# Patient Record
Sex: MALE | Race: WHITE | HISPANIC OR LATINO | Employment: OTHER | URBAN - METROPOLITAN AREA
[De-identification: names, ages, dates, MRNs, and addresses within clinical notes are randomized per-mention and may not be internally consistent; named-entity substitution may affect disease eponyms.]

---

## 2017-03-14 ENCOUNTER — TELEPHONE (OUTPATIENT)
Dept: CARDIOLOGY | Facility: CLINIC | Age: 67
End: 2017-03-14

## 2017-03-14 DIAGNOSIS — I48.20 CHRONIC ATRIAL FIBRILLATION (HCC): Primary | ICD-10-CM

## 2017-03-14 PROCEDURE — 0296T ZIO PATCH: CPT | Performed by: INTERNAL MEDICINE

## 2017-03-14 NOTE — TELEPHONE ENCOUNTER
Pt had another episode while at the gym in his home. He was doing squats and weights when all of a sudden he lost his memory and was asking his son what is this and that. Pt said it lasted 30-45min then went away. He had this happen in the Summer of 2013 and had a full work up with Neuro and nothing was found. He called Neuro today and they wanted him to check with you first before seeing him to see if it has anything to do with palpitations or blood thinner. PT does not report palpitation during exercise.

## 2017-03-15 NOTE — TELEPHONE ENCOUNTER
Spoke to pt, INR was 2.2.  Discussed with Dr. Abrams he would like pt to add baby ASA 81mg daily to his medication regimen, wear a Zio patch so we can evaluate his afib rates and see neuro (Dr. Hammond will see, he is a friend).  See in clinic in 1-2 weeks.

## 2017-03-29 ENCOUNTER — OFFICE VISIT (OUTPATIENT)
Dept: CARDIOLOGY | Facility: CLINIC | Age: 67
End: 2017-03-29

## 2017-03-29 VITALS
BODY MASS INDEX: 31.5 KG/M2 | HEART RATE: 82 BPM | WEIGHT: 225 LBS | SYSTOLIC BLOOD PRESSURE: 128 MMHG | DIASTOLIC BLOOD PRESSURE: 74 MMHG | HEIGHT: 71 IN

## 2017-03-29 DIAGNOSIS — I48.20 CHRONIC ATRIAL FIBRILLATION (HCC): ICD-10-CM

## 2017-03-29 DIAGNOSIS — I38 VALVULAR HEART DISEASE: ICD-10-CM

## 2017-03-29 DIAGNOSIS — G45.4 AMNESIA, GLOBAL, TRANSIENT: Primary | ICD-10-CM

## 2017-03-29 DIAGNOSIS — I48.20 CHRONIC ATRIAL FIBRILLATION (HCC): Primary | ICD-10-CM

## 2017-03-29 PROCEDURE — 99214 OFFICE O/P EST MOD 30 MIN: CPT | Performed by: INTERNAL MEDICINE

## 2017-03-29 NOTE — PROGRESS NOTES
Feliz CORLEY Dandre  1950  [phone]      03/29/2017    [facility] CARDIOLOGY     Srini Ornelas MD  [address]  [place] [id number]    Chief Complaint   Patient presents with   • Atrial Fibrillation       Problem List:   Problem List:   1. Long-standing persistent atrial fibrillation:  a. CHADS0.  b. Pulmonary vein isolation procedure in 2002 and 2003, [place], and February 2011 in [place] at [facility].   c. LAE approximately 5.2 cm by echo, 07/31/2013.   d. Holter monitor, 07/31/2013, with heart rate of 39-79, average 54 beats per minute with 6 PVCs and 107 PACs.   e. Recurrent atrial fibrillation, off amiodarone therapy with re-initiation of amiodarone and ECV at [facility] by Dr. Gould, 02/03/2014.  f. Hospitalization and initiation of Tikosyn with subsequent external cardioversion of atrial flutter, 06/26/2014.   g. Breakthrough atrial fibrillation with subsequent discontinuation of Tikosyn, April 2015.  h. Echocardiogram, 05/20/2015, revealing atrial fibrillation, normal LV systolic function, mild AR, moderate MR, left atrial dimension of 4.9 cm.  i. EKG, 02/16/2016, revealing atrial flutter at a rate of 100 on Toprol therapy.  2. Valvular heart disease:  a. Echocardiogram, 07/31/2013, showing mild to moderate MR, mild to moderate AI. Normal LV systolic function.   b. Echo: 5/2015: NL LVEF , mod MR, Mild AI  3. Dyslipidemia.   4. Obesity, BMI 31.   5. Tobacco abuse.   6. Hypothyroid.   7. GERD.   8. Migraine headaches.     Allergies  No Known Allergies    Current Medications    Current Outpatient Prescriptions:   •  cetirizine (ZyrTEC) 10 MG tablet, Take 10 mg by mouth daily., Disp: , Rfl:   •  digoxin (LANOXIN) 250 MCG tablet, Take 1 tablet by mouth daily., Disp: 90 tablet, Rfl: 3  •  levothyroxine (SYNTHROID, LEVOTHROID) 50 MCG tablet, Take 50 mcg by mouth daily., Disp: , Rfl:   •  metoprolol succinate XL (TOPROL-XL) 25 MG 24 hr tablet, TAKE  "ONE TABLET BY MOUTH TWICE A DAY, Disp: 180 tablet, Rfl: 2  •  montelukast (SINGULAIR) 10 MG tablet, Take 10 mg by mouth every night., Disp: , Rfl:   •  ranitidine (ZANTAC) 150 MG tablet, Take 150 mg by mouth every night., Disp: , Rfl:   •  rosuvastatin (CRESTOR) 10 MG tablet, Take 10 mg by mouth daily., Disp: , Rfl:   •  tadalafil (CIALIS) 5 MG tablet, Take 5 mg by mouth daily., Disp: , Rfl:   •  warfarin (COUMADIN) 2 MG tablet, Take 2 mg by mouth daily., Disp: , Rfl:   •  warfarin (COUMADIN) 5 MG tablet, Take 5 mg by mouth daily., Disp: , Rfl:     History of Present Illness   HPI    Pt presents for follow up of AF. Since we last saw the pt, pt had an episode of confused after working out in the basement. The confusion was about ten minutes after workout. Did not remember anything for 40 minutes. No weakness, slurred speech or syncope. Did not go the ER. PT INR was 2.2. No checked HR or BP. No further episodes since then. denies SOB, CP, LH, and dizziness. Denies any hospitalizations, ER visits, bleeding, or TIA/CVA symptoms. Overall feels well. No change in palps. See Neurologist with EEG with some abnormality. Maybe a seizure. Repeat Head MRI and carotid ultrasound which was ok. Had similar episode 4 years ago. Pt states that he has had labile PT INR and wants to switch to eliquis.    ROS:  General:  Denies fatigue, weight gain or loss  Cardiovascular:  Denies CP, PND, syncope, near syncope, edema or palpitations.  Pulmonary:  Denies WALKER, cough, or wheezing      Vitals:    03/29/17 1025   BP: 128/74   BP Location: Right arm   Patient Position: Sitting   Pulse: 82   Weight: 225 lb (102 kg)   Height: 71\" (180.3 cm)     PE:  General: NAD  Neck: no JVD, no carotid bruits, no TM  Heart RRR, NL S1, S2, S4 present, no rubs, murmurs  Lungs: CTA, no wheezes, rhonchi, or rales  Abd: soft, non-tender, NL BS  Ext: No musculoskeletal deformities, no edema, cyanosis, or clubbing  Psych: normal mood and affect    Diagnostic " Data:  Procedures    1. Amnesia, global, transient    2. Chronic atrial fibrillation    3. Valvular heart disease          Plan:  1) Global amnesia vs seizure: per Neurologist: check zio-patch  Continue present medications.   2) Anticoagulation: switch warfarin to eliquis  3) HTN  Wt loss, exercise, salt reduction  4) VHD: Repeat echocardiogram to evaluate MR etc    F/up in 6 months

## 2017-04-24 ENCOUNTER — HOSPITAL ENCOUNTER (OUTPATIENT)
Dept: CARDIOLOGY | Facility: HOSPITAL | Age: 67
Discharge: HOME | End: 2017-04-24
Attending: INTERNAL MEDICINE | Admitting: INTERNAL MEDICINE

## 2017-04-24 VITALS
WEIGHT: 225 LBS | HEIGHT: 71 IN | DIASTOLIC BLOOD PRESSURE: 69 MMHG | SYSTOLIC BLOOD PRESSURE: 124 MMHG | BODY MASS INDEX: 31.5 KG/M2

## 2017-04-24 DIAGNOSIS — I48.20 CHRONIC ATRIAL FIBRILLATION (HCC): ICD-10-CM

## 2017-04-24 LAB
BH CV ECHO MEAS - AI DEC SLOPE: 182.5 CM/SEC^2
BH CV ECHO MEAS - AI MAX PG: 65.1 MMHG
BH CV ECHO MEAS - AI MAX VEL: 403.5 CM/SEC
BH CV ECHO MEAS - AI P1/2T: 647.6 MSEC
BH CV ECHO MEAS - AO MAX PG (FULL): 2 MMHG
BH CV ECHO MEAS - AO MAX PG: 4.8 MMHG
BH CV ECHO MEAS - AO MEAN PG (FULL): 2 MMHG
BH CV ECHO MEAS - AO MEAN PG: 3 MMHG
BH CV ECHO MEAS - AO ROOT AREA (BSA CORRECTED): 1.8
BH CV ECHO MEAS - AO ROOT AREA: 12.6 CM^2
BH CV ECHO MEAS - AO ROOT DIAM: 4 CM
BH CV ECHO MEAS - AO V2 MAX: 109 CM/SEC
BH CV ECHO MEAS - AO V2 MEAN: 76.6 CM/SEC
BH CV ECHO MEAS - AO V2 VTI: 23 CM
BH CV ECHO MEAS - AVA(I,A): 2.8 CM^2
BH CV ECHO MEAS - AVA(I,D): 2.8 CM^2
BH CV ECHO MEAS - AVA(V,A): 3.3 CM^2
BH CV ECHO MEAS - AVA(V,D): 3.3 CM^2
BH CV ECHO MEAS - BSA(HAYCOCK): 2.3 M^2
BH CV ECHO MEAS - BSA: 2.2 M^2
BH CV ECHO MEAS - BZI_BMI: 31.4 KILOGRAMS/M^2
BH CV ECHO MEAS - BZI_METRIC_HEIGHT: 180.3 CM
BH CV ECHO MEAS - BZI_METRIC_WEIGHT: 102.1 KG
BH CV ECHO MEAS - CONTRAST EF (2CH): 76 ML/M^2
BH CV ECHO MEAS - CONTRAST EF 4CH: 64 ML/M^2
BH CV ECHO MEAS - EDV(CUBED): 112.7 ML
BH CV ECHO MEAS - EDV(MOD-SP2): 100 ML
BH CV ECHO MEAS - EDV(MOD-SP4): 86 ML
BH CV ECHO MEAS - EDV(TEICH): 109.1 ML
BH CV ECHO MEAS - EF(CUBED): 73.3 %
BH CV ECHO MEAS - EF(MOD-SP2): 76 %
BH CV ECHO MEAS - EF(MOD-SP4): 64 %
BH CV ECHO MEAS - EF(TEICH): 65 %
BH CV ECHO MEAS - ESV(CUBED): 30.1 ML
BH CV ECHO MEAS - ESV(MOD-SP2): 24 ML
BH CV ECHO MEAS - ESV(MOD-SP4): 31 ML
BH CV ECHO MEAS - ESV(TEICH): 38.2 ML
BH CV ECHO MEAS - FS: 35.6 %
BH CV ECHO MEAS - IVS/LVPW: 1.1
BH CV ECHO MEAS - IVSD: 1.3 CM
BH CV ECHO MEAS - LA DIMENSION: 4.8 CM
BH CV ECHO MEAS - LA/AO: 1.2
BH CV ECHO MEAS - LAT PEAK E' VEL: 12.5 CM/SEC
BH CV ECHO MEAS - LV DIASTOLIC VOL/BSA (35-75): 38.8 ML/M^2
BH CV ECHO MEAS - LV MASS(C)D: 229.2 GRAMS
BH CV ECHO MEAS - LV MASS(C)DI: 103.4 GRAMS/M^2
BH CV ECHO MEAS - LV MAX PG: 2.7 MMHG
BH CV ECHO MEAS - LV MEAN PG: 1 MMHG
BH CV ECHO MEAS - LV SYSTOLIC VOL/BSA (12-30): 14 ML/M^2
BH CV ECHO MEAS - LV V1 MAX: 82.8 CM/SEC
BH CV ECHO MEAS - LV V1 MEAN: 49.2 CM/SEC
BH CV ECHO MEAS - LV V1 VTI: 15.1 CM
BH CV ECHO MEAS - LVIDD: 4.8 CM
BH CV ECHO MEAS - LVIDS: 3.2 CM
BH CV ECHO MEAS - LVLD AP2: 7.2 CM
BH CV ECHO MEAS - LVLD AP4: 7.4 CM
BH CV ECHO MEAS - LVLS AP2: 6.4 CM
BH CV ECHO MEAS - LVLS AP4: 6.7 CM
BH CV ECHO MEAS - LVOT AREA (M): 4.2 CM^2
BH CV ECHO MEAS - LVOT AREA: 4.3 CM^2
BH CV ECHO MEAS - LVOT DIAM: 2.4 CM
BH CV ECHO MEAS - LVPWD: 1.3 CM
BH CV ECHO MEAS - MED PEAK E' VEL: 7.09 CM/SEC
BH CV ECHO MEAS - MV DEC TIME: 0.15 SEC
BH CV ECHO MEAS - MV E MAX VEL: 68.2 CM/SEC
BH CV ECHO MEAS - PA ACC SLOPE: 682 CM/SEC^2
BH CV ECHO MEAS - PA ACC TIME: 0.11 SEC
BH CV ECHO MEAS - PA MAX PG: 2.5 MMHG
BH CV ECHO MEAS - PA PR(ACCEL): 27.7 MMHG
BH CV ECHO MEAS - PA V2 MAX: 78.5 CM/SEC
BH CV ECHO MEAS - PULM DIAS VEL: 79.1 CM/SEC
BH CV ECHO MEAS - PULM S/D: 0.36
BH CV ECHO MEAS - PULM SYS VEL: 28.5 CM/SEC
BH CV ECHO MEAS - RAP SYSTOLE: 10 MMHG
BH CV ECHO MEAS - RVDD: 3.3 CM
BH CV ECHO MEAS - RVSP: 27.8 MMHG
BH CV ECHO MEAS - SI(AO): 130.4 ML/M^2
BH CV ECHO MEAS - SI(CUBED): 37.3 ML/M^2
BH CV ECHO MEAS - SI(LVOT): 29.5 ML/M^2
BH CV ECHO MEAS - SI(MOD-SP2): 34.3 ML/M^2
BH CV ECHO MEAS - SI(MOD-SP4): 24.8 ML/M^2
BH CV ECHO MEAS - SI(TEICH): 32 ML/M^2
BH CV ECHO MEAS - SV(AO): 289 ML
BH CV ECHO MEAS - SV(CUBED): 82.6 ML
BH CV ECHO MEAS - SV(LVOT): 65.5 ML
BH CV ECHO MEAS - SV(MOD-SP2): 76 ML
BH CV ECHO MEAS - SV(MOD-SP4): 55 ML
BH CV ECHO MEAS - SV(TEICH): 70.9 ML
BH CV ECHO MEAS - TAPSE (>1.6): 1.5 CM2
BH CV ECHO MEAS - TR MAX VEL: 211 CM/SEC
BH CV XLRA - RV BASE: 3.8 CM
BH CV XLRA - RV LENGTH: 6.4 CM
BH CV XLRA - RV MID: 3.8 CM
BH CV XLRA - TDI S': 9.86 CM/SEC
E/E' RATIO: 5.5
LEFT ATRIUM VOLUME INDEX: 45.9 ML/M2
LV EF 2D ECHO EST: 65 %

## 2017-04-24 PROCEDURE — 93306 TTE W/DOPPLER COMPLETE: CPT

## 2017-04-24 PROCEDURE — 93306 TTE W/DOPPLER COMPLETE: CPT | Performed by: INTERNAL MEDICINE

## 2017-05-18 ENCOUNTER — OFFICE VISIT (OUTPATIENT)
Dept: CARDIOLOGY | Facility: CLINIC | Age: 67
End: 2017-05-18

## 2017-05-18 DIAGNOSIS — I48.20 CHRONIC ATRIAL FIBRILLATION (HCC): ICD-10-CM

## 2017-05-18 PROCEDURE — 0298T ZIO PATCH: CPT | Performed by: INTERNAL MEDICINE

## 2017-08-04 RX ORDER — METOPROLOL SUCCINATE 25 MG/1
TABLET, EXTENDED RELEASE ORAL
Qty: 180 TABLET | Refills: 2 | Status: SHIPPED | OUTPATIENT
Start: 2017-08-04 | End: 2017-10-11 | Stop reason: SDUPTHER

## 2017-10-11 ENCOUNTER — OFFICE VISIT (OUTPATIENT)
Dept: CARDIOLOGY | Facility: CLINIC | Age: 67
End: 2017-10-11

## 2017-10-11 VITALS
HEART RATE: 83 BPM | SYSTOLIC BLOOD PRESSURE: 115 MMHG | BODY MASS INDEX: 30.28 KG/M2 | WEIGHT: 223.6 LBS | DIASTOLIC BLOOD PRESSURE: 66 MMHG | HEIGHT: 72 IN

## 2017-10-11 DIAGNOSIS — I48.20 CHRONIC ATRIAL FIBRILLATION (HCC): Primary | ICD-10-CM

## 2017-10-11 PROCEDURE — 99213 OFFICE O/P EST LOW 20 MIN: CPT | Performed by: INTERNAL MEDICINE

## 2017-10-11 RX ORDER — METOPROLOL SUCCINATE 25 MG/1
25 TABLET, EXTENDED RELEASE ORAL 2 TIMES DAILY
Qty: 180 TABLET | Refills: 3 | Status: SHIPPED | OUTPATIENT
Start: 2017-10-11 | End: 2021-06-16

## 2017-10-11 RX ORDER — ROSUVASTATIN CALCIUM 10 MG/1
10 TABLET, COATED ORAL DAILY
Qty: 90 TABLET | Refills: 3 | Status: SHIPPED | OUTPATIENT
Start: 2017-10-11 | End: 2018-10-22 | Stop reason: SDUPTHER

## 2017-10-11 NOTE — PROGRESS NOTES
Feliz CORLEY Dandre  1950  [phone]      10/11/2017    [facility] CARDIOLOGY     Srini Ornelas MD  [address]  [facility] [address]    Chief Complaint   Patient presents with   • Atrial Fibrillation       Problem List:   Problem List:   1. Long-standing persistent atrial fibrillation:  a. CHADS0.  b. Pulmonary vein isolation procedure in 2002 and 2003, [place], and February 2011 in [place] at [facility].   c. LAE approximately 5.2 cm by echo, 07/31/2013.   d. Holter monitor, 07/31/2013, with heart rate of 39-79, average 54 beats per minute with 6 PVCs and 107 PACs.   e. Recurrent atrial fibrillation, off amiodarone therapy with re-initiation of amiodarone and ECV at [facility] by Dr. Gould, 02/03/2014.  f. Hospitalization and initiation of Tikosyn with subsequent external cardioversion of atrial flutter, 06/26/2014.   g. Breakthrough atrial fibrillation with subsequent discontinuation of Tikosyn, April 2015.  h. Echocardiogram, 05/20/2015, revealing atrial fibrillation, normal LV systolic function, mild AR, moderate MR, left atrial dimension of 4.9 cm.  i. EKG, 02/16/2016, revealing atrial flutter at a rate of 100 on Toprol therapy.  j. Echo 4/24/17: NL LVEF, Mild MR/TR/AR  k. ER 5/22/17: AF avg HR 83 bpm with low 40 bpm   2. Valvular heart disease:  a. Echocardiogram, 07/31/2013, showing mild to moderate MR, mild to moderate AI. Normal LV systolic function.   b. Echo: 5/2015: NL LVEF , mod MR, Mild AI  3. Dyslipidemia.   4. Obesity, BMI 31.   5. Tobacco abuse.   6. Hypothyroid.   7. GERD.   8. Migraine headaches.  Allergies  No Known Allergies    Current Medications    Current Outpatient Prescriptions:   •  apixaban (ELIQUIS) 5 MG tablet tablet, Take 1 tablet by mouth 2 (Two) Times a Day., Disp: 60 tablet, Rfl: 6  •  cetirizine (ZyrTEC) 10 MG tablet, Take 10 mg by mouth daily., Disp: , Rfl:   •  digoxin (LANOXIN) 250 MCG tablet, Take 1 tablet by mouth  "daily., Disp: 90 tablet, Rfl: 3  •  levothyroxine (SYNTHROID, LEVOTHROID) 50 MCG tablet, Take 50 mcg by mouth daily., Disp: , Rfl:   •  metoprolol succinate XL (TOPROL-XL) 25 MG 24 hr tablet, TAKE ONE TABLET BY MOUTH TWICE A DAY, Disp: 180 tablet, Rfl: 2  •  montelukast (SINGULAIR) 10 MG tablet, Take 10 mg by mouth every night., Disp: , Rfl:   •  ranitidine (ZANTAC) 150 MG tablet, Take 150 mg by mouth every night., Disp: , Rfl:   •  rosuvastatin (CRESTOR) 10 MG tablet, Take 10 mg by mouth daily., Disp: , Rfl:   •  tadalafil (CIALIS) 5 MG tablet, Take 5 mg by mouth daily., Disp: , Rfl:     History of Present Illness   HPI    Pt presents for follow up of AF/VHD. Since we last saw the pt, pt denies SOB, CP, LH, and dizziness. Denies any hospitalizations, ER visits, bleeding, or TIA/CVA symptoms. Overall feels well except for DJD in knees and shoulder. No further episodes of confusion. Rare palps    ROS:  General:  + fatigue, No weight gain or loss  Cardiovascular:  Denies CP, PND, syncope, near syncope, edema + palpitations.  Pulmonary:  Denies WALKER, cough, or wheezing      Vitals:    10/11/17 1101   BP: 115/66   BP Location: Left arm   Patient Position: Sitting   Pulse: 83   Weight: 223 lb 9.6 oz (101 kg)   Height: 72\" (182.9 cm)     PE:  General: NAD  Neck: no JVD, no carotid bruits, no TM  Heart irreg irreg , NL S1, S2, no rubs, murmurs  Lungs: CTA, no wheezes, rhonchi, or rales  Abd: soft, non-tender, NL BS  Ext: No musculoskeletal deformities, no edema, cyanosis, or clubbing  Psych: normal mood and affect    Diagnostic Data:      Procedures    1. Chronic atrial fibrillation          Plan:  1) CAF: adequate HR on meds  Continue present medications.   2) Anticoagulation  Continue NOAC    F/up in 8 months      "

## 2018-02-08 ENCOUNTER — OFFICE VISIT (OUTPATIENT)
Dept: ORTHOPEDIC SURGERY | Facility: CLINIC | Age: 68
End: 2018-02-08

## 2018-02-08 ENCOUNTER — TELEPHONE (OUTPATIENT)
Dept: CARDIOLOGY | Facility: CLINIC | Age: 68
End: 2018-02-08

## 2018-02-08 VITALS
WEIGHT: 225.09 LBS | DIASTOLIC BLOOD PRESSURE: 97 MMHG | HEART RATE: 77 BPM | SYSTOLIC BLOOD PRESSURE: 157 MMHG | BODY MASS INDEX: 32.22 KG/M2 | HEIGHT: 70 IN

## 2018-02-08 DIAGNOSIS — G89.29 CHRONIC RIGHT SHOULDER PAIN: ICD-10-CM

## 2018-02-08 DIAGNOSIS — R52 PAIN: ICD-10-CM

## 2018-02-08 DIAGNOSIS — M19.019 AC (ACROMIOCLAVICULAR) ARTHRITIS: ICD-10-CM

## 2018-02-08 DIAGNOSIS — M25.511 CHRONIC RIGHT SHOULDER PAIN: ICD-10-CM

## 2018-02-08 DIAGNOSIS — M17.11 PRIMARY OSTEOARTHRITIS OF RIGHT KNEE: Primary | ICD-10-CM

## 2018-02-08 DIAGNOSIS — IMO0002 DISORDER OF ROTATOR CUFF SYNDROME OF RIGHT SHOULDER AND ALLIED DISORDER: ICD-10-CM

## 2018-02-08 DIAGNOSIS — Z72.0 TOBACCO ABUSE: ICD-10-CM

## 2018-02-08 PROCEDURE — 99203 OFFICE O/P NEW LOW 30 MIN: CPT | Performed by: ORTHOPAEDIC SURGERY

## 2018-02-08 PROCEDURE — 20610 DRAIN/INJ JOINT/BURSA W/O US: CPT | Performed by: ORTHOPAEDIC SURGERY

## 2018-02-08 RX ORDER — SENNOSIDES 8.6 MG
650 CAPSULE ORAL DAILY PRN
COMMUNITY

## 2018-02-08 RX ORDER — HYALURONATE SODIUM 10 MG/ML
20 SYRINGE (ML) INTRAARTICULAR
Status: COMPLETED | OUTPATIENT
Start: 2018-02-08 | End: 2018-02-08

## 2018-02-08 RX ORDER — TRIAMCINOLONE ACETONIDE 40 MG/ML
40 INJECTION, SUSPENSION INTRA-ARTICULAR; INTRAMUSCULAR
Status: COMPLETED | OUTPATIENT
Start: 2018-02-08 | End: 2018-02-08

## 2018-02-08 RX ORDER — ZOLPIDEM TARTRATE 10 MG/1
TABLET ORAL
Refills: 2 | COMMUNITY
Start: 2017-12-09

## 2018-02-08 RX ADMIN — Medication 20 MG: at 16:12

## 2018-02-08 RX ADMIN — TRIAMCINOLONE ACETONIDE 40 MG: 40 INJECTION, SUSPENSION INTRA-ARTICULAR; INTRAMUSCULAR at 15:54

## 2018-02-08 NOTE — PROGRESS NOTES
Procedure   Large Joint Arthrocentesis  Date/Time: 2/8/2018 3:54 PM  Consent given by: patient  Site marked: site marked  Timeout: Immediately prior to procedure a time out was called to verify the correct patient, procedure, equipment, support staff and site/side marked as required   Supporting Documentation  Indications: pain   Procedure Details  Location: shoulder - R subacromial bursa  Preparation: Patient was prepped and draped in the usual sterile fashion  Needle size: 25 G  Approach: posterior  Medications administered: 40 mg triamcinolone acetonide 40 MG/ML (3 cc xylocaine 1% NDC: 37305-833-88; LOT: [id number]; EXP: 11/21/2021)  Patient tolerance: patient tolerated the procedure well with no immediate complications

## 2018-02-08 NOTE — TELEPHONE ENCOUNTER
The patient needs clearance for a colonoscopy that he is going to have on Tuesday. They also want to know how long he needs to hold Eliquis.              Alberto Smart  (P)[phone]

## 2018-02-08 NOTE — PROGRESS NOTES
[facility] Note    Subjective     Pain of the Right Knee (Right knee anteriomedial aspect/Started a few months ago/Pain is at a 6 when sleeping/Had a MRI 5-6 months ago.  Tried a brace bu didn't really help. Tried ice also no help.) and Pain of the Right Shoulder (Right shoulder/Started hurting 4-5 years ago. Hurting more and more often now./Pain is at a 6-7 when using or sleeping on it./Hasn't tried anything.  )      ELHAM Amos is a 67 y.o. male. Patient is here for 2 problems today.  He's had chronic right shoulder pain.  It bothers him when he lifts weights when he plays golf.  He was told years ago by an orthopedic surgeon in [place] that he had a rotator cuff tear but elected at that time to not undergo surgical repair.  He has difficulty sleeping on the right shoulder.  He rates the pain is 6 out of 10.    Patient is also here for his right knee.  He was injected 6 months ago without a lot of relief by an orthopedic surgeon at [facility].  The knee hurts him in the middle the night.  He feels sharp pain.  Bracing has not helped him very much.  Location of the pain is medial primarily.  No history of locking.     Past Medical History:   Diagnosis Date   • A-fib    • Atrial fibrillation    • Atrial flutter    • Dyslipidemia    • GERD (gastroesophageal reflux disease)    • Hypothyroidism     HYPO   • Migraine headache    • Obesity     BMI 31   • Tobacco abuse    • Valvular heart disease     Echocardiogram, 07/31/2013, showing mild to moderate MR, mild to moderate AI.  Normal LV systolic function      Past Surgical History:   Procedure Laterality Date   • ABLATION OF DYSRHYTHMIC FOCUS      X 3 HEART   • CHOLECYSTECTOMY N/A 11/15/2016    Procedure: CHOLECYSTECTOMY LAPAROSCOPIC ;  Surgeon: Mag Daigle MD;  Location: [facility];  Service:    • COLONOSCOPY      X 2   • KNEE SURGERY        Family History   Problem Relation Age of Onset   • Diabetes Mother    • Heart  attack Father      Social History     Social History   • Marital status:      Spouse name: N/A   • Number of children: N/A   • Years of education: N/A     Occupational History   • Not on file.     Social History Main Topics   • Smoking status: Current Some Day Smoker     Years: 20.00     Types: Cigars   • Smokeless tobacco: Never Used   • Alcohol use Yes      Comment: 1-3  DRINKS DAILY, SOCIALLY   • Drug use: No   • Sexual activity: Defer     Other Topics Concern   • Not on file     Social History Narrative      Current Outpatient Prescriptions on File Prior to Visit   Medication Sig Dispense Refill   • apixaban (ELIQUIS) 5 MG tablet tablet Take 1 tablet by mouth 2 (Two) Times a Day. 180 tablet 3   • cetirizine (ZyrTEC) 10 MG tablet Take 10 mg by mouth daily.     • digoxin (LANOXIN) 250 MCG tablet Take 1 tablet by mouth daily. 90 tablet 3   • levothyroxine (SYNTHROID, LEVOTHROID) 50 MCG tablet Take 50 mcg by mouth daily.     • metoprolol succinate XL (TOPROL-XL) 25 MG 24 hr tablet Take 1 tablet by mouth 2 (Two) Times a Day. 180 tablet 3   • montelukast (SINGULAIR) 10 MG tablet Take 10 mg by mouth every night.     • ranitidine (ZANTAC) 150 MG tablet Take 150 mg by mouth every night.     • rosuvastatin (CRESTOR) 10 MG tablet Take 1 tablet by mouth Daily. 90 tablet 3   • tadalafil (CIALIS) 5 MG tablet Take 5 mg by mouth daily.       No current facility-administered medications on file prior to visit.       No Known Allergies     The following portions of the patient's history were reviewed and updated as appropriate: allergies, current medications, past family history, past medical history, past social history, past surgical history and problem list.    Review of Systems   Constitutional: Positive for activity change.   Cardiovascular: Positive for palpitations.   Musculoskeletal: Positive for arthralgias and back pain.   Hematological: Bruises/bleeds easily.        Objective      Physical Exam  /97   "Pulse 77  Ht 177.2 cm (69.75\")  Wt 102 kg (225 lb 1.4 oz)  BMI 32.53 kg/m2    Body mass index is 32.53 kg/(m^2).    General  Mental Status - alert  General Appearance - cooperative, well groomed, not in acute distress  Orientation - Oriented X3  Build & Nutrition - well developed and well nourished  Posture - normal posture  Gait - normal gait     Integumentary  Global Assessment  Examination of related systems reveals - no lymphadenopathy  General Characteristics  Overall examination of the patient's skin reveals - no rashes, no evidence of scars, no suspicious lesions and no bruises.  Color - normal coloration of skin.    Ortho Exam  Peripheral Vascular:    Upper Extremity:   Inspection:  Left--no cyanotic nail beds Right--no cyanotic nail beds   Bilateral:  Pink nail beds with brisk capillary refill   Palpation:  Bilateral radial pulse normal    Musculoskeletal:  Global Assessment:  Overall assessment of Lower Extremity Muscle Strength and Tone:  Right quadriceps--5/5   Right hamstrings--5/5       Right tibialis anterior--5/5  Right gastroc-soleus--5/5  Right EHL --5/5    Lower Extremity:  Knee/Patella:  No digital clubbing or cyanosis.    Examination of right knee reveals:  Normal deep tendon reflexes, coordination, strength, tone, sensation.  No known fractures or deformities.    Inspection and Palpation:  Right knee:  Tenderness:  Over the medial joint line and moderate severity  Effusion:  1+  Crepitus:  Positive  Pulses:  2+  Ecchymosis:  None  Warmth:  None     ROM:  Right:  Extension: 5    Flexion:120  Left:  Extension: 5     Flexion: 120    Instability:    Right:  Lachman Test:  Negative, Varus stress test negative, Valgus stress test negative    Deformities/Malalignments/Discrepancies:    Left:  No deformities   Right:  Genu Varum    Functional Testing:  Russell's test:  Negative  Patella grind test:  Positive  Q-angle:  normal    Musculoskeletal   Upper Extremity   Right Shoulder     Inspection and " Palpation:     Tenderness - mildly over the acromioclavicular joint    Crepitus - none    Sensation is normal    Examination reveals no ecchymosis.        Strength and Tone:    Supraspinatus -5 out of 5 with pain    External Rotators-5 out of 5 with mild pain    Infraspinatus - 5/5    Subscapularis - 5 out of 5    Deltoid - 5/5     Range of Motion   Left shoulder:    Internal Rotation: ROM - T7    External Rotation: AROM - 80 degrees    Elevation through flexion: AROM - 180 degrees    Right Shoulder:    Internal Rotation: ROM - L4    External Rotation: AROM - 60 degrees    Elevation through flexion: AROM - 150 degrees      Instability   Right shoulder    Sulcus sign negative    Apprehension test negative    Shira relocation test negative    Jerk test negative     Impingement   Right shoulder    Sherwood-Jose impingement test mildly positive    Neer impingement test negative     Functional Testing   Right shoulder    AC crossover adduction test mildly positive    Abdominal compression test negative    Lift-off sign negative    Speed's test negative    Aguilera's test negative    Hornblower's sign negative       Imaging/Studies  X-rays of the shoulder are taken in the office today and reviewed.  Patient has a type III acromion and endstage arthritis at the acromioclavicular joint.  He has no proximal migration of the humeral head however.  He has relatively good joint space preservation on the axillary lateral of the glenohumeral joint.  On the axillary lateral, there is a slight irregularity of the anterior cortex of the humerus which has a roughened appearance.  There is no soft tissue mass appreciated.    X-rays of his right knee show moderate to early severe arthritis of the medial compartment and patellofemoral compartment of the right knee.    Assessment:  1. Primary osteoarthritis of right knee    2. Chronic right shoulder pain    3. AC (acromioclavicular) arthritis    4. Pain    5. Disorder of rotator cuff  syndrome of right shoulder and allied disorder    6. Tobacco abuse        Plan:  1. Continue over-the-counter medication as needed.  He should use Tylenol because of his Eliquis prescription  2. For his right shoulder pain, this is likely a rotator cuff tear and we will try to get him some relief with a subacromial injection.  This be given today.If the patient's right shoulder pain persists, we'll have a low threshold to get an MRI especially given the findings on the axillary lateral of the anterior cortex of the humerus.  3. With regards to his right knee, he got no relief from a cortisone injection months ago.  I suggested we try a viscous supplement.  We will start a course of Euflexxa today.  See him back in a week for Euflexxa No. 2.  4. Patient has mentioned a low back issue and he tells me he will seek his own referral to [facility] physiotherapy in [place].      Medical Decision Making  Management Options : over-the-counter medicine and prescription/IM medicine  Data/Risk: radiology tests and independent visualization of imaging, lab tests, or EMG/NCV    Martin Olsen MD  02/08/18  5:38 PM

## 2018-02-08 NOTE — PROGRESS NOTES
Procedure   Large Joint Arthrocentesis  Date/Time: 2/8/2018 4:12 PM  Consent given by: patient  Site marked: site marked  Timeout: Immediately prior to procedure a time out was called to verify the correct patient, procedure, equipment, support staff and site/side marked as required   Supporting Documentation  Indications: pain   Procedure Details  Location: knee - R knee  Preparation: Patient was prepped and draped in the usual sterile fashion  Needle size: 22 G  Approach: superior  Medications administered: 20 mg Sodium Hyaluronate 20 MG/2ML (3cc, 1%Xylocaine 300mg/30ml Lot#[id number] exp [id number])  Aspirate: clear (to verify intraarticular placement of the needle)  Patient tolerance: patient tolerated the procedure well with no immediate complications

## 2018-02-15 ENCOUNTER — CLINICAL SUPPORT (OUTPATIENT)
Dept: ORTHOPEDIC SURGERY | Facility: CLINIC | Age: 68
End: 2018-02-15

## 2018-02-15 DIAGNOSIS — M17.11 PRIMARY OSTEOARTHRITIS OF RIGHT KNEE: Primary | ICD-10-CM

## 2018-02-15 PROCEDURE — 20610 DRAIN/INJ JOINT/BURSA W/O US: CPT | Performed by: ORTHOPAEDIC SURGERY

## 2018-02-15 RX ORDER — HYALURONATE SODIUM 10 MG/ML
20 SYRINGE (ML) INTRAARTICULAR
Status: COMPLETED | OUTPATIENT
Start: 2018-02-15 | End: 2018-02-15

## 2018-02-15 RX ADMIN — Medication 20 MG: at 15:41

## 2018-02-15 NOTE — PROGRESS NOTES
Patient is here for Euflexxa No. 2 to the right knee.  The injection is helping him.  Injection was tolerated well today we will see him back in a week for Euflexxa No. 3.

## 2018-02-15 NOTE — PROGRESS NOTES
Procedure   Large Joint Arthrocentesis  Date/Time: 2/15/2018 3:41 PM  Consent given by: patient  Site marked: site marked  Timeout: Immediately prior to procedure a time out was called to verify the correct patient, procedure, equipment, support staff and site/side marked as required   Supporting Documentation  Indications: pain   Procedure Details  Location: knee - R knee  Preparation: Patient was prepped and draped in the usual sterile fashion  Needle size: 22 G  Approach: superior  Medications administered: 20 mg Sodium Hyaluronate 20 MG/2ML (3cc lidocaine 1%, NDC 04356-133-77, Lot [id number], exp [id number])  Aspirate: clear (to verify intraarticular placement of the needle)  Patient tolerance: patient tolerated the procedure well with no immediate complications

## 2018-03-01 ENCOUNTER — CLINICAL SUPPORT (OUTPATIENT)
Dept: ORTHOPEDIC SURGERY | Facility: CLINIC | Age: 68
End: 2018-03-01

## 2018-03-01 DIAGNOSIS — M17.11 PRIMARY OSTEOARTHRITIS OF RIGHT KNEE: Primary | ICD-10-CM

## 2018-03-01 PROCEDURE — 20610 DRAIN/INJ JOINT/BURSA W/O US: CPT | Performed by: ORTHOPAEDIC SURGERY

## 2018-03-01 RX ORDER — METOPROLOL SUCCINATE 25 MG/1
25 TABLET, EXTENDED RELEASE ORAL 2 TIMES DAILY
COMMUNITY
Start: 2018-02-09 | End: 2018-06-13 | Stop reason: SDUPTHER

## 2018-03-01 RX ORDER — DIGOXIN 250 MCG
250 TABLET ORAL DAILY
COMMUNITY
Start: 2018-02-09 | End: 2018-03-01 | Stop reason: SDUPTHER

## 2018-03-01 RX ORDER — LIDOCAINE HYDROCHLORIDE 10 MG/ML
4 INJECTION, SOLUTION INFILTRATION; PERINEURAL
Status: COMPLETED | OUTPATIENT
Start: 2018-03-01 | End: 2018-03-01

## 2018-03-01 RX ORDER — CETIRIZINE HYDROCHLORIDE 10 MG/1
10 TABLET ORAL DAILY
COMMUNITY
Start: 2018-02-09 | End: 2018-03-01 | Stop reason: SDUPTHER

## 2018-03-01 RX ORDER — HYALURONATE SODIUM 10 MG/ML
20 SYRINGE (ML) INTRAARTICULAR
Status: COMPLETED | OUTPATIENT
Start: 2018-03-01 | End: 2018-03-01

## 2018-03-01 RX ADMIN — LIDOCAINE HYDROCHLORIDE 4 ML: 10 INJECTION, SOLUTION INFILTRATION; PERINEURAL at 10:43

## 2018-03-01 RX ADMIN — Medication 20 MG: at 10:43

## 2018-03-01 NOTE — PROGRESS NOTES
Patient is here for Euflexxa No. 3 to the right knee.  Tolerated well.  See the patient back in 6 weeks.

## 2018-03-01 NOTE — PROGRESS NOTES
Subjective     Injections (Right knee Euflexxa Injection #3)      Feliz Amos is a 67 y.o. male.     History of Present Illness     No Known Allergies  Current Outpatient Prescriptions on File Prior to Visit   Medication Sig Dispense Refill   • acetaminophen (TYLENOL) 650 MG 8 hr tablet Take 650 mg by mouth Daily.     • apixaban (ELIQUIS) 5 MG tablet tablet Take 1 tablet by mouth 2 (Two) Times a Day. 180 tablet 3   • cetirizine (ZyrTEC) 10 MG tablet Take 10 mg by mouth daily.     • digoxin (LANOXIN) 250 MCG tablet Take 1 tablet by mouth daily. 90 tablet 3   • Glucosamine-Chondroitin 250-200 MG tablet Take  by mouth.     • levothyroxine (SYNTHROID, LEVOTHROID) 50 MCG tablet Take 50 mcg by mouth daily.     • metoprolol succinate XL (TOPROL-XL) 25 MG 24 hr tablet Take 1 tablet by mouth 2 (Two) Times a Day. 180 tablet 3   • montelukast (SINGULAIR) 10 MG tablet Take 10 mg by mouth every night.     • ranitidine (ZANTAC) 150 MG tablet Take 150 mg by mouth every night.     • rosuvastatin (CRESTOR) 10 MG tablet Take 1 tablet by mouth Daily. 90 tablet 3   • tadalafil (CIALIS) 5 MG tablet Take 5 mg by mouth daily.     • zolpidem (AMBIEN) 10 MG tablet TAKE 1 TABLET AT BEDTIME  2     No current facility-administered medications on file prior to visit.      Social History     Social History   • Marital status:      Spouse name: N/A   • Number of children: N/A   • Years of education: N/A     Occupational History   • Not on file.     Social History Main Topics   • Smoking status: Current Some Day Smoker     Years: 20.00     Types: Cigars   • Smokeless tobacco: Never Used   • Alcohol use Yes      Comment: 1-3  DRINKS DAILY, SOCIALLY   • Drug use: No   • Sexual activity: Defer     Other Topics Concern   • Not on file     Social History Narrative     Past Surgical History:   Procedure Laterality Date   • ABLATION OF DYSRHYTHMIC FOCUS      X 3 HEART   • CHOLECYSTECTOMY N/A 11/15/2016    Procedure: CHOLECYSTECTOMY LAPAROSCOPIC  ";  Surgeon: Mag Daigle MD;  [facility];  Service:    • COLONOSCOPY      X 2   • KNEE SURGERY       Family History   Problem Relation Age of Onset   • Diabetes Mother    • Heart attack Father      Past Medical History:   Diagnosis Date   • A-fib    • Atrial fibrillation    • Atrial flutter    • Dyslipidemia    • GERD (gastroesophageal reflux disease)    • Hypothyroidism     HYPO   • Migraine headache    • Obesity     BMI 31   • Tobacco abuse    • Valvular heart disease     Echocardiogram, 07/31/2013, showing mild to moderate MR, mild to moderate AI.  Normal LV systolic function         Review of Systems    The following portions of the patient's history were reviewed and updated as appropriate: {history reviewed:20406::\"allergies\",\"current medications\",\"past family history\",\"past medical history\",\"past social history\",\"past surgical history\",\"problem list\"}.    Ortho Exam      Medical Decision Making    Assessment and Plan/ Diagnosis/Treatment options:   ***      "

## 2018-03-01 NOTE — PROGRESS NOTES
Procedure   Large Joint Arthrocentesis  Date/Time: 3/1/2018 10:43 AM  Consent given by: patient  Site marked: site marked  Timeout: Immediately prior to procedure a time out was called to verify the correct patient, procedure, equipment, support staff and site/side marked as required   Supporting Documentation  Indications: pain   Procedure Details  Location: knee - R knee  Preparation: Patient was prepped and draped in the usual sterile fashion  Needle size: 22 G  Approach: superior  Medications administered: 20 mg Sodium Hyaluronate 20 MG/2ML; 4 mL lidocaine 1 %  Aspirate: clear (to verify intraarticular placement of the needle)  Patient tolerance: patient tolerated the procedure well with no immediate complications

## 2018-03-22 ENCOUNTER — TELEPHONE (OUTPATIENT)
Dept: CARDIOLOGY | Facility: CLINIC | Age: 68
End: 2018-03-22

## 2018-03-22 NOTE — TELEPHONE ENCOUNTER
Dr. Anoop Hawkins needs cardiac clearance for vasectomy and needs to hold Eliquis 3 days prior and 3 days after procedure.    T [phone]  F [phone]

## 2018-06-13 ENCOUNTER — OFFICE VISIT (OUTPATIENT)
Dept: CARDIOLOGY | Facility: CLINIC | Age: 68
End: 2018-06-13

## 2018-06-13 VITALS
WEIGHT: 220.8 LBS | SYSTOLIC BLOOD PRESSURE: 102 MMHG | DIASTOLIC BLOOD PRESSURE: 66 MMHG | HEIGHT: 72 IN | BODY MASS INDEX: 29.91 KG/M2 | HEART RATE: 68 BPM

## 2018-06-13 DIAGNOSIS — I38 VALVULAR HEART DISEASE: ICD-10-CM

## 2018-06-13 DIAGNOSIS — I48.20 CHRONIC ATRIAL FIBRILLATION (HCC): Primary | ICD-10-CM

## 2018-06-13 PROCEDURE — 99213 OFFICE O/P EST LOW 20 MIN: CPT | Performed by: INTERNAL MEDICINE

## 2018-06-13 NOTE — PROGRESS NOTES
Feliz CORLEY Dandre  1950  [phone]      06/13/2018    [facility] CARDIOLOGY     Srini Ornelas MD  [address]  [facility] [address]    Chief Complaint   Patient presents with   • Atrial Fibrillation       Problem List:   1. Long-standing persistent atrial fibrillation:  a. CHADS0.  b. Pulmonary vein isolation procedure in 2002 and 2003, [place], and February 2011 in [place] at [facility].   c. LAE approximately 5.2 cm by echo, 07/31/2013.   d. Holter monitor, 07/31/2013, with heart rate of 39-79, average 54 beats per minute with 6 PVCs and 107 PACs.   e. Recurrent atrial fibrillation, off amiodarone therapy with re-initiation of amiodarone and ECV at [facility] by Dr. Gould, 02/03/2014.  f. Hospitalization and initiation of Tikosyn with subsequent external cardioversion of atrial flutter, 06/26/2014.   g. Breakthrough atrial fibrillation with subsequent discontinuation of Tikosyn, April 2015.  h. Echocardiogram, 05/20/2015, revealing atrial fibrillation, normal LV systolic function, mild AR, moderate MR, left atrial dimension of 4.9 cm.  i. EKG, 02/16/2016, revealing atrial flutter at a rate of 100 on Toprol therapy.  j. Echo 4/24/17: NL LVEF, Mild MR/TR/AR  k. ER 5/22/17: AF avg HR 83 bpm with low 40 bpm   2. Valvular heart disease:  a. Echocardiogram, 07/31/2013, showing mild to moderate MR, mild to moderate AI. Normal LV systolic function.   b. Echo: 5/2015: NL LVEF , mod MR, Mild AI  3. Dyslipidemia.   4. Obesity, BMI 31.   5. Tobacco abuse.   6. Hypothyroid.   7. GERD.   8. Migraine headaches.  Allergies  No Known Allergies    Current Medications    Current Outpatient Prescriptions:   •  acetaminophen (TYLENOL) 650 MG 8 hr tablet, Take 650 mg by mouth Daily As Needed., Disp: , Rfl:   •  apixaban (ELIQUIS) 5 MG tablet tablet, Take 1 tablet by mouth 2 (Two) Times a Day., Disp: 180 tablet, Rfl: 3  •  cetirizine (ZyrTEC) 10 MG tablet, Take 10 mg by  "mouth daily., Disp: , Rfl:   •  digoxin (LANOXIN) 250 MCG tablet, Take 1 tablet by mouth daily., Disp: 90 tablet, Rfl: 3  •  Glucosamine-Chondroitin 250-200 MG tablet, Take  by mouth., Disp: , Rfl:   •  levothyroxine (SYNTHROID, LEVOTHROID) 50 MCG tablet, Take 50 mcg by mouth daily., Disp: , Rfl:   •  metoprolol succinate XL (TOPROL-XL) 25 MG 24 hr tablet, Take 1 tablet by mouth 2 (Two) Times a Day. (Patient taking differently: Take 25 mg by mouth Daily.), Disp: 180 tablet, Rfl: 3  •  montelukast (SINGULAIR) 10 MG tablet, Take 10 mg by mouth At Night As Needed., Disp: , Rfl:   •  ranitidine (ZANTAC) 150 MG tablet, Take 150 mg by mouth every night., Disp: , Rfl:   •  rosuvastatin (CRESTOR) 10 MG tablet, Take 1 tablet by mouth Daily., Disp: 90 tablet, Rfl: 3  •  tadalafil (CIALIS) 5 MG tablet, Take 5 mg by mouth daily., Disp: , Rfl:   •  zolpidem (AMBIEN) 10 MG tablet, TAKE 1 TABLET AT BEDTIME prn, Disp: , Rfl: 2    History of Present Illness   HPI    Pt presents for follow up of AF/VHD. Since we last saw the pt, pt denies any sustained palps, SOB, CP, LH, and dizziness. Denies any hospitalizations, ER visits, bleeding, or TIA/CVA symptoms. Overall feels well. Not exercises as much do to a lot of travel. Rare palps.     ROS:  General:  + fatigue, + voluntary weight loss  Cardiovascular:  Denies CP, PND, syncope, near syncope, edema + rare palpitations.  Pulmonary:  Denies WALKER, cough, or wheezing      Vitals:    06/13/18 0909   BP: 102/66   BP Location: Right arm   Patient Position: Sitting   Pulse: 68   Weight: 100 kg (220 lb 12.8 oz)   Height: 182.9 cm (72\")     Body mass index is 29.95 kg/m².  PE:  General: NAD  Neck: no JVD, no carotid bruits, no TM  Heart irreg irreg rate and rhythm, NL S1, S2,  no rubs, murmurs  Lungs: CTA, no wheezes, rhonchi, or rales  Abd: soft, non-tender, NL BS  Ext: No musculoskeletal deformities, no edema, cyanosis, or clubbing  Psych: normal mood and affect    Diagnostic " Data:      Procedures    1. Chronic atrial fibrillation    2. Valvular heart disease          Plan:  1) CAF: HR well controlled   Continue present medications.   2) Anticoagulation  Continue NOAC  3) VHD: stable    F/up in 9 months

## 2018-07-26 ENCOUNTER — TRANSCRIBE ORDERS (OUTPATIENT)
Dept: ADMINISTRATIVE | Facility: HOSPITAL | Age: 68
End: 2018-07-26

## 2018-07-26 DIAGNOSIS — M54.12 CERVICAL RADICULOPATHY: Primary | ICD-10-CM

## 2018-07-26 DIAGNOSIS — M75.41 IMPINGEMENT SYNDROME OF RIGHT SHOULDER: ICD-10-CM

## 2018-07-28 ENCOUNTER — APPOINTMENT (OUTPATIENT)
Dept: MRI IMAGING | Facility: HOSPITAL | Age: 68
End: 2018-07-28
Attending: INTERNAL MEDICINE

## 2018-07-28 ENCOUNTER — HOSPITAL ENCOUNTER (OUTPATIENT)
Dept: MRI IMAGING | Facility: HOSPITAL | Age: 68
Discharge: HOME | End: 2018-07-28
Attending: INTERNAL MEDICINE | Admitting: INTERNAL MEDICINE

## 2018-07-28 DIAGNOSIS — M54.12 CERVICAL RADICULOPATHY: ICD-10-CM

## 2018-07-28 PROCEDURE — 72141 MRI NECK SPINE W/O DYE: CPT

## 2018-07-31 ENCOUNTER — HOSPITAL ENCOUNTER (OUTPATIENT)
Dept: MRI IMAGING | Facility: HOSPITAL | Age: 68
Discharge: HOME | End: 2018-07-31
Attending: ORTHOPAEDIC SURGERY | Admitting: ORTHOPAEDIC SURGERY

## 2018-07-31 ENCOUNTER — OFFICE VISIT (OUTPATIENT)
Dept: ORTHOPEDIC SURGERY | Facility: CLINIC | Age: 68
End: 2018-07-31

## 2018-07-31 VITALS — HEIGHT: 72 IN | BODY MASS INDEX: 29.65 KG/M2 | HEART RATE: 84 BPM | OXYGEN SATURATION: 98 % | WEIGHT: 218.92 LBS

## 2018-07-31 DIAGNOSIS — IMO0002 DISORDER OF ROTATOR CUFF SYNDROME OF RIGHT SHOULDER AND ALLIED DISORDER: ICD-10-CM

## 2018-07-31 DIAGNOSIS — M19.019 AC (ACROMIOCLAVICULAR) ARTHRITIS: ICD-10-CM

## 2018-07-31 DIAGNOSIS — G89.29 CHRONIC RIGHT SHOULDER PAIN: Primary | ICD-10-CM

## 2018-07-31 DIAGNOSIS — M25.511 CHRONIC RIGHT SHOULDER PAIN: Primary | ICD-10-CM

## 2018-07-31 DIAGNOSIS — G89.29 CHRONIC RIGHT SHOULDER PAIN: ICD-10-CM

## 2018-07-31 DIAGNOSIS — M25.511 CHRONIC RIGHT SHOULDER PAIN: ICD-10-CM

## 2018-07-31 PROCEDURE — 73221 MRI JOINT UPR EXTREM W/O DYE: CPT

## 2018-07-31 PROCEDURE — 20610 DRAIN/INJ JOINT/BURSA W/O US: CPT | Performed by: ORTHOPAEDIC SURGERY

## 2018-07-31 PROCEDURE — 99213 OFFICE O/P EST LOW 20 MIN: CPT | Performed by: ORTHOPAEDIC SURGERY

## 2018-07-31 RX ORDER — TRIAMCINOLONE ACETONIDE 40 MG/ML
40 INJECTION, SUSPENSION INTRA-ARTICULAR; INTRAMUSCULAR
Status: COMPLETED | OUTPATIENT
Start: 2018-07-31 | End: 2018-07-31

## 2018-07-31 RX ORDER — LIDOCAINE HYDROCHLORIDE 10 MG/ML
3 INJECTION, SOLUTION INFILTRATION; PERINEURAL
Status: COMPLETED | OUTPATIENT
Start: 2018-07-31 | End: 2018-07-31

## 2018-07-31 RX ORDER — MELOXICAM 7.5 MG/1
7.5 TABLET ORAL DAILY
COMMUNITY
End: 2019-02-06

## 2018-07-31 RX ADMIN — TRIAMCINOLONE ACETONIDE 40 MG: 40 INJECTION, SUSPENSION INTRA-ARTICULAR; INTRAMUSCULAR at 10:46

## 2018-07-31 RX ADMIN — LIDOCAINE HYDROCHLORIDE 3 ML: 10 INJECTION, SOLUTION INFILTRATION; PERINEURAL at 10:46

## 2018-07-31 NOTE — PROGRESS NOTES
Procedure   Large Joint Arthrocentesis  Date/Time: 7/31/2018 10:46 AM  Consent given by: patient  Site marked: site marked  Timeout: Immediately prior to procedure a time out was called to verify the correct patient, procedure, equipment, support staff and site/side marked as required   Supporting Documentation  Indications: pain   Procedure Details  Location: shoulder - R subacromial bursa  Preparation: Patient was prepped and draped in the usual sterile fashion  Needle size: 25 G  Approach: posterior  Medications administered: 3 mL lidocaine 1 %; 40 mg triamcinolone acetonide 40 MG/ML  Patient tolerance: patient tolerated the procedure well with no immediate complications

## 2018-07-31 NOTE — PROGRESS NOTES
"    [facility] [place] [place] Orthopaedic Surgery Clinic Note    Subjective     CC: Pain and Follow-up of the Right Shoulder (Unable to sleep for the last month due to the pain in the shoulder. Cortisone injection given 02/08/18.) and Follow-up (6 week follow up following Euflexxa injection series - Right knee )      HPI    Feliz Amos is a 67 y.o. male.  Patient returns for follow-up.  He is here for his right shoulder.  He has been seen by well-known shoulder surgeon in [place] in the past and told he had a rotator cuff tear.  He was told that the recovery would not be worth it and therefore he is pursued nonoperative treatment to date.  We injected him in February and he got excellent relief.  He's had worsening pain over the last month and has difficulty sleeping.  He has been given meloxicam which is helped only a little bit.  He is also be given a topical anti-inflammatory.  This was given by Dr. Ornelas, his friend.       ROS:    Constiutional:Pt denies fever, chills, nausea, or vomiting.  MSK:as above    Objective      Past Medical History  Past Medical History:   Diagnosis Date   • A-fib (CMS/HCC)    • Atrial fibrillation (CMS/HCC)    • Atrial flutter (CMS/HCC)    • Dyslipidemia    • GERD (gastroesophageal reflux disease)    • Hypothyroidism     HYPO   • Migraine headache    • Obesity     BMI 31   • Tobacco abuse    • Valvular heart disease     Echocardiogram, 07/31/2013, showing mild to moderate MR, mild to moderate AI.  Normal LV systolic function         Physical Exam  Pulse 84   Ht 182.9 cm (72.01\")   Wt 99.3 kg (218 lb 14.7 oz)   SpO2 98%   BMI 29.68 kg/m²     Body mass index is 29.68 kg/m².    Patient is well nourished and well developed.        Ortho Exam  Musculoskeletal   Upper Extremity   Right Shoulder     Inspection and Palpation:     AC Joint Tenderness - negative    Sensation is normal    Examination reveals no ecchymosis.        Strength and Tone:    Supraspinatus - 4/5    External " Rotators-5/5    Infraspinatus - 5/5    Subscapularis - 4/5    Deltoid - 5/5     Range of Motion      RightShoulder:    Internal Rotation: ROM - L4    External Rotation: AROM - 60 degrees    Elevation through flexion: AROM - 140 degrees        Impingement   Right shoulder    Sherwood-Jose impingement test positive    Neer impingement test negative     Functional Testing   Right shoulder    AC crossover adduction test negative    Imaging/Labs/EMG Reviewed:  Imaging Results (last 24 hours)     ** No results found for the last 24 hours. **          Assessment:  1. Chronic right shoulder pain    2. AC (acromioclavicular) arthritis    3. Disorder of rotator cuff syndrome of right shoulder and allied disorder        Plan:  1. Recommend over the counter anti-inflammatories for pain and/or swelling  2. We have gone back and looked at his x-rays from February.  Patient has a type III acromion.  He has acromioclavicular joint arthritis as well.  The acromion morphology is what jumps out on the x-ray.  There is a mention of an irregular anterior cortex on the axillary lateral x-ray and I think this likely an over call.  3. Plan will be for an MRI of his right shoulder to further evaluate the cuff.  Patient likely has a large rotator cuff tear.  If he's had this for quite some time, and may not be reparable.  He may not want to pursue repair either.  4. Subacromial injection will be given today to help with symptoms.      Medical Decision Making  Management Options : over-the-counter medicine and prescription/IM medicine  Data/Risk: radiology tests    Martin Olsen MD  07/31/18  12:48 PM

## 2018-08-02 ENCOUNTER — OFFICE VISIT (OUTPATIENT)
Dept: ORTHOPEDIC SURGERY | Facility: CLINIC | Age: 68
End: 2018-08-02

## 2018-08-02 VITALS — HEART RATE: 80 BPM | BODY MASS INDEX: 29.56 KG/M2 | HEIGHT: 72 IN | OXYGEN SATURATION: 98 % | WEIGHT: 218.26 LBS

## 2018-08-02 DIAGNOSIS — M75.121 COMPLETE TEAR OF RIGHT ROTATOR CUFF: ICD-10-CM

## 2018-08-02 DIAGNOSIS — G89.29 CHRONIC RIGHT SHOULDER PAIN: Primary | ICD-10-CM

## 2018-08-02 DIAGNOSIS — M19.019 AC (ACROMIOCLAVICULAR) ARTHRITIS: ICD-10-CM

## 2018-08-02 DIAGNOSIS — M25.511 CHRONIC RIGHT SHOULDER PAIN: Primary | ICD-10-CM

## 2018-08-02 PROCEDURE — 99213 OFFICE O/P EST LOW 20 MIN: CPT | Performed by: ORTHOPAEDIC SURGERY

## 2018-08-02 NOTE — PROGRESS NOTES
"    [facility] [place] Orthopaedic Surgery Clinic Note    Subjective     CC: Follow-up (2 day - MRI follow - Chronic Right Shoulder Pain)      ELHAM Amos is a 67 y.o. male.  Patient is here today to follow-up after the MRI of his right shoulder.  He was injected a few days ago and has yet to get relief.  He is able to play golf without a lot of difficulty.       ROS:    Constiutional:Pt denies fever, chills, nausea, or vomiting.  MSK:as above    Objective      Past Medical History  Past Medical History:   Diagnosis Date   • A-fib (CMS/HCC)    • Atrial fibrillation (CMS/HCC)    • Atrial flutter (CMS/HCC)    • Dyslipidemia    • GERD (gastroesophageal reflux disease)    • Hypothyroidism     HYPO   • Migraine headache    • Obesity     BMI 31   • Tobacco abuse    • Valvular heart disease     Echocardiogram, 07/31/2013, showing mild to moderate MR, mild to moderate AI.  Normal LV systolic function         Physical Exam  Pulse 80   Ht 182.9 cm (72.01\")   Wt 99 kg (218 lb 4.1 oz)   SpO2 98%   BMI 29.59 kg/m²     Body mass index is 29.59 kg/m².    Patient is well nourished and well developed.        Ortho Exam  Musculoskeletal   Upper Extremity   Right Shoulder     Inspection and Palpation:     AC Joint Tenderness - mild    Sensation is normal    Examination reveals no ecchymosis.        Strength and Tone:    Supraspinatus - 4/5    External Rotators-5/5    Infraspinatus - 5/5    Subscapularis - 5/5    Deltoid - 5/5     Range of Motion      RightShoulder:    Internal Rotation: ROM - L4    External Rotation: AROM - 60 degrees    Elevation through flexion: AROM - 140 degrees        Impingement   Right shoulder    Sherwood-Jose impingement test positive    Neer impingement test negative     Functional Testing   Right shoulder    AC crossover adduction test mildly positive    Imaging/Labs/EMG Reviewed:  Imaging Results (last 24 hours)     ** No results found for the last 24 hours. **      Review the MRI of the patient's right " shoulder through Epic shows a fairly significant delaminated rotator cuff tear of the supraspinatus.  The tendon is hanging on by just a few fibers.  The reading is of a partially torn long head of biceps tendon with medial displacement into the subscap.  I don't appreciate that.  There is also significant acromioclavicular joint arthropathy.    Assessment:  1. Chronic right shoulder pain    2. Complete tear of right rotator cuff    3. AC (acromioclavicular) arthritis        Plan:  1. Recommend over the counter anti-inflammatories for pain and/or swelling  2. We have had a long discussion with the patient about treatment options and alternatives.  At this point, patient does not want to undergo the surgery with a the risks associated with the procedure.  We talked to him at length about the risks, benefits, potential hazards, typical recovery and rehabilitation and he has decided to forego any type of surgery on the right shoulder because of his lifestyle and the fact that he is constantly on ago.  3. I've told call me in about 4-6 weeks and we will see how he is doing overall.  Can consider modalities to the acromioclavicular joint plus or minus glenohumeral joint at that time as well.  We have told the patient today that we will notify his PCP and treating physicians of what is going on.      Medical Decision Making  Management Options : over-the-counter medicine  Data/Risk: radiology tests and independent visualization of imaging, lab tests, or EMG/NCV    Martin Olsen MD  08/02/18  2:58 PM

## 2018-10-22 RX ORDER — ROSUVASTATIN CALCIUM 10 MG/1
10 TABLET, COATED ORAL DAILY
Qty: 90 TABLET | Refills: 0 | Status: SHIPPED | OUTPATIENT
Start: 2018-10-22 | End: 2021-06-16

## 2018-10-23 RX ORDER — APIXABAN 5 MG/1
5 TABLET, FILM COATED ORAL
Qty: 180 TABLET | Refills: 2 | Status: SHIPPED | OUTPATIENT
Start: 2018-10-23 | End: 2019-10-14 | Stop reason: SDUPTHER

## 2019-02-06 ENCOUNTER — OFFICE VISIT (OUTPATIENT)
Dept: CARDIOLOGY | Facility: CLINIC | Age: 69
End: 2019-02-06

## 2019-02-06 VITALS
HEIGHT: 72 IN | BODY MASS INDEX: 29.8 KG/M2 | WEIGHT: 220 LBS | DIASTOLIC BLOOD PRESSURE: 70 MMHG | HEART RATE: 95 BPM | OXYGEN SATURATION: 96 % | SYSTOLIC BLOOD PRESSURE: 124 MMHG

## 2019-02-06 DIAGNOSIS — I38 VALVULAR HEART DISEASE: ICD-10-CM

## 2019-02-06 DIAGNOSIS — R06.09 DOE (DYSPNEA ON EXERTION): ICD-10-CM

## 2019-02-06 DIAGNOSIS — I48.20 CHRONIC ATRIAL FIBRILLATION (HCC): Primary | ICD-10-CM

## 2019-02-06 PROCEDURE — 99213 OFFICE O/P EST LOW 20 MIN: CPT | Performed by: INTERNAL MEDICINE

## 2019-02-06 NOTE — PROGRESS NOTES
Feliz CORLEY Dandre  1950    There is no work phone number on file.      02/06/2019    [facility] CARDIOLOGY     Srini Ornelas MD  [address]  [facility] [address]    Chief Complaint   Patient presents with   • Atrial Fibrillation       Problem List:   1. Long-standing persistent atrial fibrillation:  a. CHADS0.  b. Pulmonary vein isolation procedure in 2002 and 2003, [place], and February 2011 in [place] at [facility].   c. LAE approximately 5.2 cm by echo, 07/31/2013.   d. Holter monitor, 07/31/2013, with heart rate of 39-79, average 54 beats per minute with 6 PVCs and 107 PACs.   e. Recurrent atrial fibrillation, off amiodarone therapy with re-initiation of amiodarone and ECV at [facility] by Dr. Gould, 02/03/2014.  f. Hospitalization and initiation of Tikosyn with subsequent external cardioversion of atrial flutter, 06/26/2014.   g. Breakthrough atrial fibrillation with subsequent discontinuation of Tikosyn, April 2015.  h. Echocardiogram, 05/20/2015, revealing atrial fibrillation, normal LV systolic function, mild AR, moderate MR, left atrial dimension of 4.9 cm.  i. EKG, 02/16/2016, revealing atrial flutter at a rate of 100 on Toprol therapy.  j. Echo 4/24/17: NL LVEF, Mild MR/TR/AR  k. ER 5/22/17: AF avg HR 83 bpm with low 40 bpm   2. Valvular heart disease:  a. Echocardiogram, 07/31/2013, showing mild to moderate MR, mild to moderate AI. Normal LV systolic function.   b. Echo: 5/2015: NL LVEF , mod MR, Mild AI  3. Dyslipidemia.   4. Obesity, BMI 31.   5. Tobacco abuse.   6. Hypothyroid.   7. GERD.   8. Migraine headaches.      Allergies  No Known Allergies    Current Medications    Current Outpatient Medications:   •  acetaminophen (TYLENOL) 650 MG 8 hr tablet, Take 650 mg by mouth Daily As Needed., Disp: , Rfl:   •  cetirizine (ZyrTEC) 10 MG tablet, Take 10 mg by mouth daily., Disp: , Rfl:   •  digoxin (LANOXIN) 250 MCG tablet, Take 1 tablet by  "mouth daily. (Patient taking differently: Take 250 mcg by mouth Every Other Day.), Disp: 90 tablet, Rfl: 3  •  ELIQUIS 5 MG tablet tablet, TAKE 1 TABLET BY MOUTH 2 (TWO) TIMES A DAY., Disp: 180 tablet, Rfl: 2  •  Glucosamine-Chondroitin 250-200 MG tablet, Take  by mouth., Disp: , Rfl:   •  levothyroxine (SYNTHROID, LEVOTHROID) 50 MCG tablet, Take 50 mcg by mouth daily., Disp: , Rfl:   •  metoprolol succinate XL (TOPROL-XL) 25 MG 24 hr tablet, Take 1 tablet by mouth 2 (Two) Times a Day. (Patient taking differently: Take 25 mg by mouth Daily.), Disp: 180 tablet, Rfl: 3  •  ranitidine (ZANTAC) 150 MG tablet, Take 150 mg by mouth every night., Disp: , Rfl:   •  rosuvastatin (CRESTOR) 10 MG tablet, TAKE 1 TABLET BY MOUTH DAILY. (Patient taking differently: Take 10 mg by mouth Every Other Day.), Disp: 90 tablet, Rfl: 0  •  zolpidem (AMBIEN) 10 MG tablet, TAKE 1 TABLET AT BEDTIME prn, Disp: , Rfl: 2    History of Present Illness   HPI    Pt presents for follow up of atrial fibrillation. Since we last saw the pt, pt denies any awareness of significant palps, CP, LH, and dizziness. Denies any hospitalizations, ER visits, bleeding, or TIA/CVA symptoms. Overall feels well except for WALKER    ROS:  General:  Denies fatigue, weight gain or loss  Cardiovascular:  Denies CP, PND, syncope, near syncope, edema or palpitations.  Pulmonary:  + WALKER, no cough, or wheezing      Vitals:    02/06/19 1534   BP: 124/70   BP Location: Left arm   Patient Position: Sitting   Pulse: 95   SpO2: 96%   Weight: 99.8 kg (220 lb)   Height: 182.9 cm (72\")     PE:  General: NAD  Neck: no JVD, no carotid bruits, no TM  Heart irreg irreg rate and rhythm NL S1, S2, no rubs, murmurs  Lungs: CTA, no wheezes, rhonchi, or rales  Abd: soft, non-tender, NL BS  Ext: No musculoskeletal deformities, no edema, cyanosis, or clubbing  Psych: normal mood and affect    Diagnostic Data:      Procedures    1. Chronic atrial fibrillation (CMS/HCC)    2. Valvular heart " disease    3. WALKER (dyspnea on exertion)          Plan:  1) Chronic atrial fibrillation:  -heart rates stable for the most part: echo for WALKER  - Continue present medications.   2) Anticoagulation:  - Continue Eliquis  3) VHD:  - Stable per last echo in 2017: repeat echo    F/up in 8 months    Scribed for Levi Abrams MD by Madisyn Guan, BRAIN. 2/6/2019  4:36 PM     I, Levi Abrams MD, personally performed the services described in this documentation as scribed by the above named individual in my presence, and it is both accurate and complete.  2/6/2019  4:36 PM

## 2019-02-20 ENCOUNTER — HOSPITAL ENCOUNTER (OUTPATIENT)
Dept: CARDIOLOGY | Facility: HOSPITAL | Age: 69
Discharge: HOME | End: 2019-02-20
Admitting: INTERNAL MEDICINE

## 2019-02-20 VITALS — BODY MASS INDEX: 29.8 KG/M2 | HEIGHT: 72 IN | WEIGHT: 220 LBS

## 2019-02-20 DIAGNOSIS — I48.20 CHRONIC ATRIAL FIBRILLATION (HCC): ICD-10-CM

## 2019-02-20 LAB
ASCENDING AORTA: 4.4 CM
BH CV ECHO MEAS - AI DEC SLOPE: 310 CM/SEC^2
BH CV ECHO MEAS - AI MAX PG: 78.3 MMHG
BH CV ECHO MEAS - AI MAX VEL: 442.3 CM/SEC
BH CV ECHO MEAS - AI P1/2T: 393 MSEC
BH CV ECHO MEAS - AO MAX PG (FULL): 0.16 MMHG
BH CV ECHO MEAS - AO MAX PG: 3.6 MMHG
BH CV ECHO MEAS - AO MEAN PG (FULL): 0.06 MMHG
BH CV ECHO MEAS - AO MEAN PG: 2 MMHG
BH CV ECHO MEAS - AO ROOT AREA (BSA CORRECTED): 2.1
BH CV ECHO MEAS - AO ROOT AREA: 16.8 CM^2
BH CV ECHO MEAS - AO ROOT DIAM: 4.6 CM
BH CV ECHO MEAS - AO V2 MAX: 95 CM/SEC
BH CV ECHO MEAS - AO V2 MEAN: 66.3 CM/SEC
BH CV ECHO MEAS - AO V2 VTI: 18.2 CM
BH CV ECHO MEAS - ASC AORTA: 4.4 CM
BH CV ECHO MEAS - AVA(I,A): 4 CM^2
BH CV ECHO MEAS - AVA(I,D): 4 CM^2
BH CV ECHO MEAS - AVA(V,A): 3.8 CM^2
BH CV ECHO MEAS - AVA(V,D): 3.8 CM^2
BH CV ECHO MEAS - BSA(HAYCOCK): 2.3 M^2
BH CV ECHO MEAS - BSA: 2.2 M^2
BH CV ECHO MEAS - BZI_BMI: 29.8 KILOGRAMS/M^2
BH CV ECHO MEAS - BZI_METRIC_HEIGHT: 182.9 CM
BH CV ECHO MEAS - BZI_METRIC_WEIGHT: 99.8 KG
BH CV ECHO MEAS - EDV(CUBED): 165.5 ML
BH CV ECHO MEAS - EDV(MOD-SP2): 113 ML
BH CV ECHO MEAS - EDV(MOD-SP4): 111 ML
BH CV ECHO MEAS - EDV(TEICH): 146.8 ML
BH CV ECHO MEAS - EF(CUBED): 64.3 %
BH CV ECHO MEAS - EF(MOD-BP): 55 %
BH CV ECHO MEAS - EF(MOD-SP2): 54.9 %
BH CV ECHO MEAS - EF(MOD-SP4): 56.8 %
BH CV ECHO MEAS - EF(TEICH): 55.3 %
BH CV ECHO MEAS - ESV(CUBED): 59 ML
BH CV ECHO MEAS - ESV(MOD-SP2): 51 ML
BH CV ECHO MEAS - ESV(MOD-SP4): 48 ML
BH CV ECHO MEAS - ESV(TEICH): 65.6 ML
BH CV ECHO MEAS - FS: 29.1 %
BH CV ECHO MEAS - IVS/LVPW: 1
BH CV ECHO MEAS - IVSD: 1.2 CM
BH CV ECHO MEAS - LA DIMENSION: 4.9 CM
BH CV ECHO MEAS - LA/AO: 1.1
BH CV ECHO MEAS - LAD MAJOR: 7.8 CM
BH CV ECHO MEAS - LAT PEAK E' VEL: 10.3 CM/SEC
BH CV ECHO MEAS - LATERAL E/E' RATIO: 7.9
BH CV ECHO MEAS - LV DIASTOLIC VOL/BSA (35-75): 50 ML/M^2
BH CV ECHO MEAS - LV MASS(C)D: 283.1 GRAMS
BH CV ECHO MEAS - LV MASS(C)DI: 127.6 GRAMS/M^2
BH CV ECHO MEAS - LV MAX PG: 3.4 MMHG
BH CV ECHO MEAS - LV MEAN PG: 1.9 MMHG
BH CV ECHO MEAS - LV SYSTOLIC VOL/BSA (12-30): 21.6 ML/M^2
BH CV ECHO MEAS - LV V1 MAX: 92.9 CM/SEC
BH CV ECHO MEAS - LV V1 MEAN: 65.4 CM/SEC
BH CV ECHO MEAS - LV V1 VTI: 19 CM
BH CV ECHO MEAS - LVIDD: 5.5 CM
BH CV ECHO MEAS - LVIDS: 3.9 CM
BH CV ECHO MEAS - LVLD AP2: 7.8 CM
BH CV ECHO MEAS - LVLD AP4: 7.5 CM
BH CV ECHO MEAS - LVLS AP2: 7.4 CM
BH CV ECHO MEAS - LVLS AP4: 6.7 CM
BH CV ECHO MEAS - LVOT AREA (M): 3.8 CM^2
BH CV ECHO MEAS - LVOT AREA: 3.8 CM^2
BH CV ECHO MEAS - LVOT DIAM: 2.2 CM
BH CV ECHO MEAS - LVPWD: 1.2 CM
BH CV ECHO MEAS - MED PEAK E' VEL: 6.5 CM/SEC
BH CV ECHO MEAS - MEDIAL E/E' RATIO: 12.5
BH CV ECHO MEAS - MV A MAX VEL: 54.8 CM/SEC
BH CV ECHO MEAS - MV DEC TIME: 0.18 SEC
BH CV ECHO MEAS - MV E MAX VEL: 82.9 CM/SEC
BH CV ECHO MEAS - MV E/A: 1.5
BH CV ECHO MEAS - MV MAX PG: 5.4 MMHG
BH CV ECHO MEAS - MV MEAN PG: 1.3 MMHG
BH CV ECHO MEAS - MV V2 MAX: 116.2 CM/SEC
BH CV ECHO MEAS - MV V2 MEAN: 50.6 CM/SEC
BH CV ECHO MEAS - MV V2 VTI: 24.2 CM
BH CV ECHO MEAS - MVA(VTI): 3 CM^2
BH CV ECHO MEAS - PA ACC SLOPE: 459.4 CM/SEC^2
BH CV ECHO MEAS - PA ACC TIME: 0.16 SEC
BH CV ECHO MEAS - PA MAX PG: 3.5 MMHG
BH CV ECHO MEAS - PA PR(ACCEL): 8.9 MMHG
BH CV ECHO MEAS - PA V2 MAX: 93.8 CM/SEC
BH CV ECHO MEAS - RAP SYSTOLE: 8 MMHG
BH CV ECHO MEAS - RVSP: 25.9 MMHG
BH CV ECHO MEAS - SI(AO): 138.2 ML/M^2
BH CV ECHO MEAS - SI(CUBED): 48 ML/M^2
BH CV ECHO MEAS - SI(LVOT): 32.8 ML/M^2
BH CV ECHO MEAS - SI(MOD-SP2): 27.9 ML/M^2
BH CV ECHO MEAS - SI(MOD-SP4): 28.4 ML/M^2
BH CV ECHO MEAS - SI(TEICH): 36.6 ML/M^2
BH CV ECHO MEAS - SV(AO): 306.6 ML
BH CV ECHO MEAS - SV(CUBED): 106.5 ML
BH CV ECHO MEAS - SV(LVOT): 72.9 ML
BH CV ECHO MEAS - SV(MOD-SP2): 62 ML
BH CV ECHO MEAS - SV(MOD-SP4): 63 ML
BH CV ECHO MEAS - SV(TEICH): 81.2 ML
BH CV ECHO MEAS - TAPSE (>1.6): 1.6 CM2
BH CV ECHO MEAS - TR MAX PG: 17.9 MMHG
BH CV ECHO MEAS - TR MAX VEL: 211.4 CM/SEC
BH CV ECHO MEASUREMENTS AVERAGE E/E' RATIO: 9.87
BH CV VAS BP LEFT ARM: NORMAL MMHG
BH CV XLRA - RV BASE: 3.7 CM
BH CV XLRA - RV LENGTH: 7.5 CM
BH CV XLRA - RV MID: 3.2 CM
BH CV XLRA - TDI S': 9.32 CM/SEC
LEFT ATRIUM VOLUME INDEX: 68.1 ML/M^2
LEFT ATRIUM VOLUME: 151 ML
MV VENA CONTRACTA: 0.4 CM

## 2019-02-20 PROCEDURE — 93306 TTE W/DOPPLER COMPLETE: CPT

## 2019-02-20 PROCEDURE — 93306 TTE W/DOPPLER COMPLETE: CPT | Performed by: INTERNAL MEDICINE

## 2019-03-04 DIAGNOSIS — I35.1 NONRHEUMATIC AORTIC VALVE INSUFFICIENCY: Primary | ICD-10-CM

## 2019-03-04 DIAGNOSIS — I77.810 AORTIC ROOT DILATION (HCC): ICD-10-CM

## 2019-03-04 DIAGNOSIS — I77.810 ASCENDING AORTA DILATION (HCC): ICD-10-CM

## 2019-03-04 NOTE — PROGRESS NOTES
Dr. Abrams called the patient with the results of his echocardiogram.  He would like for him to undergo a cardiac MRI for assessment of his aortic root/ascending aorta dilatation and for aortic valve regurgitant volume.

## 2019-04-09 ENCOUNTER — OUTSIDE FACILITY SERVICE (OUTPATIENT)
Dept: CARDIOLOGY | Facility: CLINIC | Age: 69
End: 2019-04-09

## 2019-04-09 ENCOUNTER — HOSPITAL ENCOUNTER (OUTPATIENT)
Dept: MRI IMAGING | Facility: HOSPITAL | Age: 69
Discharge: HOME | End: 2019-04-09
Admitting: INTERNAL MEDICINE

## 2019-04-09 DIAGNOSIS — I77.810 ASCENDING AORTA DILATION (HCC): ICD-10-CM

## 2019-04-09 DIAGNOSIS — I35.1 NONRHEUMATIC AORTIC VALVE INSUFFICIENCY: ICD-10-CM

## 2019-04-09 DIAGNOSIS — I77.810 AORTIC ROOT DILATION (HCC): ICD-10-CM

## 2019-04-09 PROCEDURE — 82565 ASSAY OF CREATININE: CPT

## 2019-04-09 PROCEDURE — 75561 CARDIAC MRI FOR MORPH W/DYE: CPT | Performed by: INTERNAL MEDICINE

## 2019-04-09 PROCEDURE — A9577 INJ MULTIHANCE: HCPCS | Performed by: INTERNAL MEDICINE

## 2019-04-09 PROCEDURE — 75561 CARDIAC MRI FOR MORPH W/DYE: CPT

## 2019-04-09 PROCEDURE — 0 GADOBENATE DIMEGLUMINE 529 MG/ML SOLUTION: Performed by: INTERNAL MEDICINE

## 2019-04-09 RX ADMIN — GADOBENATE DIMEGLUMINE 20 ML: 529 INJECTION, SOLUTION INTRAVENOUS at 14:35

## 2019-04-09 RX ADMIN — GADOBENATE DIMEGLUMINE 6 ML: 529 INJECTION, SOLUTION INTRAVENOUS at 14:34

## 2019-04-11 LAB — CREAT BLDA-MCNC: 0.8 MG/DL (ref 0.6–1.3)

## 2019-04-16 ENCOUNTER — TRANSCRIBE ORDERS (OUTPATIENT)
Dept: ADMINISTRATIVE | Facility: HOSPITAL | Age: 69
End: 2019-04-16

## 2019-04-16 DIAGNOSIS — G45.9 TIA (TRANSIENT ISCHEMIC ATTACK): Primary | ICD-10-CM

## 2019-04-17 ENCOUNTER — HOSPITAL ENCOUNTER (OUTPATIENT)
Dept: MRI IMAGING | Facility: HOSPITAL | Age: 69
Discharge: HOME | End: 2019-04-17
Admitting: PSYCHIATRY & NEUROLOGY

## 2019-04-17 ENCOUNTER — HOSPITAL ENCOUNTER (OUTPATIENT)
Dept: MRI IMAGING | Facility: HOSPITAL | Age: 69
Discharge: HOME | End: 2019-04-17

## 2019-04-17 DIAGNOSIS — G45.9 TIA (TRANSIENT ISCHEMIC ATTACK): ICD-10-CM

## 2019-04-17 PROCEDURE — 0 GADOBENATE DIMEGLUMINE 529 MG/ML SOLUTION: Performed by: PSYCHIATRY & NEUROLOGY

## 2019-04-17 PROCEDURE — 70553 MRI BRAIN STEM W/O & W/DYE: CPT

## 2019-04-17 PROCEDURE — A9577 INJ MULTIHANCE: HCPCS | Performed by: PSYCHIATRY & NEUROLOGY

## 2019-04-17 PROCEDURE — 70544 MR ANGIOGRAPHY HEAD W/O DYE: CPT

## 2019-04-17 RX ADMIN — GADOBENATE DIMEGLUMINE 20 ML: 529 INJECTION, SOLUTION INTRAVENOUS at 18:29

## 2019-06-24 RX ORDER — ROSUVASTATIN CALCIUM 10 MG/1
TABLET, COATED ORAL
Qty: 90 TABLET | Refills: 0 | OUTPATIENT
Start: 2019-06-24

## 2019-07-26 ENCOUNTER — OUTSIDE FACILITY SERVICE (OUTPATIENT)
Dept: CARDIOLOGY | Facility: CLINIC | Age: 69
End: 2019-07-26

## 2019-11-22 RX ORDER — APIXABAN 5 MG/1
TABLET, FILM COATED ORAL
Qty: 60 TABLET | Refills: 2 | Status: SHIPPED | OUTPATIENT
Start: 2019-11-22 | End: 2020-03-03

## 2020-03-03 RX ORDER — APIXABAN 5 MG/1
TABLET, FILM COATED ORAL
Qty: 60 TABLET | Refills: 11 | Status: SHIPPED | OUTPATIENT
Start: 2020-03-03 | End: 2021-06-16 | Stop reason: SDUPTHER

## 2021-06-16 ENCOUNTER — OFFICE VISIT (OUTPATIENT)
Dept: CARDIOLOGY | Facility: CLINIC | Age: 71
End: 2021-06-16

## 2021-06-16 VITALS
SYSTOLIC BLOOD PRESSURE: 114 MMHG | HEART RATE: 72 BPM | BODY MASS INDEX: 29.26 KG/M2 | WEIGHT: 216 LBS | OXYGEN SATURATION: 97 % | DIASTOLIC BLOOD PRESSURE: 64 MMHG | HEIGHT: 72 IN

## 2021-06-16 DIAGNOSIS — I38 VALVULAR HEART DISEASE: ICD-10-CM

## 2021-06-16 DIAGNOSIS — I48.21 PERMANENT ATRIAL FIBRILLATION (HCC): Primary | ICD-10-CM

## 2021-06-16 PROCEDURE — 99213 OFFICE O/P EST LOW 20 MIN: CPT | Performed by: INTERNAL MEDICINE

## 2021-06-16 PROCEDURE — 93000 ELECTROCARDIOGRAM COMPLETE: CPT | Performed by: INTERNAL MEDICINE

## 2021-06-16 RX ORDER — ROSUVASTATIN CALCIUM 5 MG/1
5 TABLET, COATED ORAL DAILY
COMMUNITY
End: 2021-06-16 | Stop reason: SDUPTHER

## 2021-06-16 RX ORDER — DIGOXIN 250 MCG
250 TABLET ORAL
COMMUNITY
End: 2021-06-16

## 2021-06-16 RX ORDER — CHLORAL HYDRATE 500 MG
1000 CAPSULE ORAL
COMMUNITY

## 2021-06-16 RX ORDER — ROSUVASTATIN CALCIUM 5 MG/1
5 TABLET, COATED ORAL DAILY
Qty: 30 TABLET | Refills: 11 | Status: SHIPPED | OUTPATIENT
Start: 2021-06-16 | End: 2022-10-12 | Stop reason: SDUPTHER

## 2021-06-16 RX ORDER — METOPROLOL SUCCINATE 25 MG/1
25 TABLET, EXTENDED RELEASE ORAL DAILY
COMMUNITY
End: 2021-06-16 | Stop reason: SDUPTHER

## 2021-06-16 RX ORDER — METOPROLOL SUCCINATE 25 MG/1
25 TABLET, EXTENDED RELEASE ORAL DAILY
Qty: 30 TABLET | Refills: 11 | Status: SHIPPED | OUTPATIENT
Start: 2021-06-16 | End: 2022-09-26

## 2021-06-16 RX ORDER — TADALAFIL 5 MG/1
5 TABLET ORAL DAILY
COMMUNITY

## 2021-06-16 RX ORDER — MULTIVIT WITH MINERALS/LUTEIN
250 TABLET ORAL DAILY
COMMUNITY

## 2021-06-16 NOTE — PROGRESS NOTES
Feliz Amos  1950  [phone]    06/16/2021    [facility] CARDIOLOGY     Referring Provider: No ref. provider found     Srini Ornelas MD  [address]  [facility] [address]    Chief Complaint   Patient presents with   • Chronic atrial fibrillation (CMS/HCC)       Problem List:   1. Long-standing persistent atrial fibrillation:  a. CHADS0.  b. Pulmonary vein isolation procedure in 2002 and 2003, [place], and February 2011 in [place] at [facility].   c. LAE approximately 5.2 cm by echo, 07/31/2013.   d. Holter monitor, 07/31/2013, with heart rate of 39-79, average 54 beats per minute with 6 PVCs and 107 PACs.   e. Recurrent atrial fibrillation, off amiodarone therapy with re-initiation of amiodarone and ECV at [facility] by Dr. Gould, 02/03/2014.  f. Hospitalization and initiation of Tikosyn with subsequent external cardioversion of atrial flutter, 06/26/2014.   g. Breakthrough atrial fibrillation with subsequent discontinuation of Tikosyn, April 2015.  h. Echocardiogram, 05/20/2015, revealing atrial fibrillation, normal LV systolic function, mild AR, moderate MR, left atrial dimension of 4.9 cm.  i. EKG, 02/16/2016, revealing atrial flutter at a rate of 100 on Toprol therapy.  j. Echo 4/24/17: NL LVEF, Mild MR/TR/AR  k. ER 5/22/17: AF avg HR 83 bpm with low 40 bpm   2. Valvular heart disease:  a. Echocardiogram, 07/31/2013, showing mild to moderate MR, mild to moderate AI. Normal LV systolic function.   b. Echo: 5/2015: NL LVEF , mod MR, Mild AI  c. Echocardiogram 2/20/2019: EF 55%, Moderate dilation of the aortic root is present, measures 4.6 cm. Moderate dilation of the ascending aorta is present, measures 4.4 cm, moderate AR, LA severely dilated. When compared with prior study there has been increase in aortic root dilation, aortic regurgitation moderate  3. Dyslipidemia.   4. Obesity, BMI 31.   5. Tobacco abuse.   6. Hypothyroid.   7. GERD.    8. Migraine headaches.    Allergies  No Known Allergies    Current Medications    Current Outpatient Medications:   •  acetaminophen (TYLENOL) 650 MG 8 hr tablet, Take 650 mg by mouth Daily As Needed., Disp: , Rfl:   •  cetirizine (ZyrTEC) 10 MG tablet, Take 10 mg by mouth As Needed., Disp: , Rfl:   •  ELIQUIS 5 MG tablet tablet, TAKE 1 TABLET BY MOUTH TWICE A DAY, Disp: 60 tablet, Rfl: 11  •  Glucosamine-Chondroitin 250-200 MG tablet, Take  by mouth., Disp: , Rfl:   •  levothyroxine (SYNTHROID, LEVOTHROID) 50 MCG tablet, Take 50 mcg by mouth daily., Disp: , Rfl:   •  Magnesium 100 MG tablet, Take  by mouth Daily., Disp: , Rfl:   •  metoprolol succinate XL (TOPROL-XL) 25 MG 24 hr tablet, Take 25 mg by mouth Daily., Disp: , Rfl:   •  Omega-3 Fatty Acids (fish oil) 1000 MG capsule capsule, Take  by mouth Daily With Breakfast., Disp: , Rfl:   •  rosuvastatin (CRESTOR) 5 MG tablet, Take 5 mg by mouth Daily., Disp: , Rfl:   •  tadalafil (Cialis) 5 MG tablet, Take 5 mg by mouth Daily., Disp: , Rfl:   •  vitamin C (ASCORBIC ACID) 250 MG tablet, Take 250 mg by mouth Daily., Disp: , Rfl:   •  zolpidem (AMBIEN) 10 MG tablet, TAKE 1 TABLET AT BEDTIME prn, Disp: , Rfl: 2    History of Present Illness     Pt presents for follow up of permanent atrial fibrillation, VHD. Since we last saw the pt, he was in [place] for 15 months with the Global Pandemic. He has been doing well and has been working out with a  and feels good. His HRs have been stable, between 60-90s, average in 70-80s. He denies significant SOB, no CP, LH, and dizziness. Denies any hospitalizations, ER visits, bleeding, or TIA/CVA symptoms. Overall feels well.     ROS:  General:  Denies fatigue, weight gain or loss  Cardiovascular:  Denies CP, PND, syncope, near syncope, edema or palpitations.  Pulmonary:  Denies WALKER, cough, or wheezing      Vitals:    06/16/21 0848   BP: 114/64   BP Location: Left arm   Patient Position: Sitting   Cuff Size: Adult   Pulse:  "72   SpO2: 97%   Weight: 98 kg (216 lb)   Height: 182.9 cm (72\")     Body mass index is 29.29 kg/m².  PE:  General: NAD  Neck: no JVD, no carotid bruits, no TM  Heart: irr, irr, NL S1, S2, no rubs, murmurs  Lungs: CTA, no wheezes, rhonchi, or rales  Abd: soft, non-tender, NL BS  Ext: No musculoskeletal deformities, no edema, cyanosis, or clubbing  Psych: normal mood and affect    Diagnostic Data:        ECG 12 Lead    Date/Time: 6/16/2021 9:23 AM  Performed by: Levi Abrams MD  Authorized by: Levi Abrams MD   Comparison: compared with previous ECG from 11/14/2016  Similar to previous ECG  Rhythm: atrial fibrillation  BPM: 72              1. Permanent atrial fibrillation (CMS/HCC)    2. Valvular heart disease          Plan:  1) Permanent atrial fibrillation:  -heart rates stable on Metoprolol.   - Continue present medications.   2) Anticoagulation:  - Continue Eliquis  3) VHD:  - EF 55%, Moderate dilation of the aortic root is present, measures 4.6 cm.  is present, measures 4.4 cm, Moderate dilation of the ascending aorta, moderate AR, LA severely dilated; needs repeat echo for assessment of AI/Moderate dilation of the ascending aorta    F/up in 12 months    Scribed for Levi Abrams MD by Carly Miller PA-C. 6/16/2021  09:24 EDT     I, Levi Abrams MD, personally performed the services described in this documentation as scribed by the above named individual in my presence, and it is both accurate and complete.  6/16/2021  09:40 EDT      "

## 2021-07-21 ENCOUNTER — HOSPITAL ENCOUNTER (OUTPATIENT)
Dept: CARDIOLOGY | Facility: HOSPITAL | Age: 71
Discharge: HOME | End: 2021-07-21
Admitting: INTERNAL MEDICINE

## 2021-07-21 VITALS — BODY MASS INDEX: 29.26 KG/M2 | WEIGHT: 216 LBS | HEIGHT: 72 IN

## 2021-07-21 DIAGNOSIS — I48.21 PERMANENT ATRIAL FIBRILLATION (HCC): ICD-10-CM

## 2021-07-21 LAB
ASCENDING AORTA: 4.4 CM
BH CV ECHO MEAS - AI DEC SLOPE: 214 CM/SEC^2
BH CV ECHO MEAS - AI MAX PG: 55.1 MMHG
BH CV ECHO MEAS - AI MAX VEL: 370.8 CM/SEC
BH CV ECHO MEAS - AI P1/2T: 507.5 MSEC
BH CV ECHO MEAS - AO MAX PG (FULL): 4.6 MMHG
BH CV ECHO MEAS - AO MAX PG: 6.8 MMHG
BH CV ECHO MEAS - AO MEAN PG (FULL): 3 MMHG
BH CV ECHO MEAS - AO MEAN PG: 4 MMHG
BH CV ECHO MEAS - AO ROOT AREA (BSA CORRECTED): 2.1
BH CV ECHO MEAS - AO ROOT AREA: 16.6 CM^2
BH CV ECHO MEAS - AO ROOT DIAM: 4.6 CM
BH CV ECHO MEAS - AO V2 MAX: 130 CM/SEC
BH CV ECHO MEAS - AO V2 MEAN: 89.4 CM/SEC
BH CV ECHO MEAS - AO V2 VTI: 27.2 CM
BH CV ECHO MEAS - ASC AORTA: 4.4 CM
BH CV ECHO MEAS - AVA(I,A): 1.7 CM^2
BH CV ECHO MEAS - AVA(I,D): 1.7 CM^2
BH CV ECHO MEAS - AVA(V,A): 1.8 CM^2
BH CV ECHO MEAS - AVA(V,D): 1.8 CM^2
BH CV ECHO MEAS - BSA(HAYCOCK): 2.3 M^2
BH CV ECHO MEAS - BSA: 2.2 M^2
BH CV ECHO MEAS - BZI_BMI: 29.3 KILOGRAMS/M^2
BH CV ECHO MEAS - BZI_METRIC_HEIGHT: 182.9 CM
BH CV ECHO MEAS - BZI_METRIC_WEIGHT: 98 KG
BH CV ECHO MEAS - EDV(CUBED): 156.6 ML
BH CV ECHO MEAS - EDV(MOD-SP2): 123 ML
BH CV ECHO MEAS - EDV(MOD-SP4): 107 ML
BH CV ECHO MEAS - EDV(TEICH): 140.7 ML
BH CV ECHO MEAS - EF(CUBED): 62.1 %
BH CV ECHO MEAS - EF(MOD-BP): 51 %
BH CV ECHO MEAS - EF(MOD-SP2): 52.8 %
BH CV ECHO MEAS - EF(MOD-SP4): 48.6 %
BH CV ECHO MEAS - EF(TEICH): 53.2 %
BH CV ECHO MEAS - ESV(CUBED): 59.3 ML
BH CV ECHO MEAS - ESV(MOD-SP2): 58 ML
BH CV ECHO MEAS - ESV(MOD-SP4): 55 ML
BH CV ECHO MEAS - ESV(TEICH): 65.9 ML
BH CV ECHO MEAS - FS: 27.6 %
BH CV ECHO MEAS - IVS/LVPW: 1.5
BH CV ECHO MEAS - IVSD: 1.4 CM
BH CV ECHO MEAS - LA DIMENSION: 5.3 CM
BH CV ECHO MEAS - LA/AO: 1.2
BH CV ECHO MEAS - LAD MAJOR: 6.6 CM
BH CV ECHO MEAS - LAT PEAK E' VEL: 13.5 CM/SEC
BH CV ECHO MEAS - LATERAL E/E' RATIO: 6
BH CV ECHO MEAS - LV DIASTOLIC VOL/BSA (35-75): 48.6 ML/M^2
BH CV ECHO MEAS - LV IVRT: 0.05 SEC
BH CV ECHO MEAS - LV MASS(C)D: 263.8 GRAMS
BH CV ECHO MEAS - LV MASS(C)DI: 119.8 GRAMS/M^2
BH CV ECHO MEAS - LV MAX PG: 2.2 MMHG
BH CV ECHO MEAS - LV MEAN PG: 1 MMHG
BH CV ECHO MEAS - LV SYSTOLIC VOL/BSA (12-30): 25 ML/M^2
BH CV ECHO MEAS - LV V1 MAX: 74.2 CM/SEC
BH CV ECHO MEAS - LV V1 MEAN: 49.8 CM/SEC
BH CV ECHO MEAS - LV V1 VTI: 14.5 CM
BH CV ECHO MEAS - LVIDD: 5.4 CM
BH CV ECHO MEAS - LVIDS: 3.9 CM
BH CV ECHO MEAS - LVLD AP2: 7.9 CM
BH CV ECHO MEAS - LVLD AP4: 8 CM
BH CV ECHO MEAS - LVLS AP2: 6.9 CM
BH CV ECHO MEAS - LVLS AP4: 7 CM
BH CV ECHO MEAS - LVOT AREA (M): 3.1 CM^2
BH CV ECHO MEAS - LVOT AREA: 3.1 CM^2
BH CV ECHO MEAS - LVOT DIAM: 2 CM
BH CV ECHO MEAS - LVPWD: 1.4 CM
BH CV ECHO MEAS - MED PEAK E' VEL: 9.5 CM/SEC
BH CV ECHO MEAS - MEDIAL E/E' RATIO: 8.5
BH CV ECHO MEAS - MV A MAX VEL: 45.9 CM/SEC
BH CV ECHO MEAS - MV DEC SLOPE: 540 CM/SEC^2
BH CV ECHO MEAS - MV DEC TIME: 0.2 SEC
BH CV ECHO MEAS - MV E MAX VEL: 80.5 CM/SEC
BH CV ECHO MEAS - MV E/A: 1.8
BH CV ECHO MEAS - MV MAX PG: 6.6 MMHG
BH CV ECHO MEAS - MV MEAN PG: 3 MMHG
BH CV ECHO MEAS - MV P1/2T MAX VEL: 130 CM/SEC
BH CV ECHO MEAS - MV P1/2T: 70.5 MSEC
BH CV ECHO MEAS - MV V2 MAX: 128 CM/SEC
BH CV ECHO MEAS - MV V2 MEAN: 71.4 CM/SEC
BH CV ECHO MEAS - MV V2 VTI: 17.9 CM
BH CV ECHO MEAS - MVA P1/2T LCG: 1.7 CM^2
BH CV ECHO MEAS - MVA(P1/2T): 3.1 CM^2
BH CV ECHO MEAS - MVA(VTI): 2.5 CM^2
BH CV ECHO MEAS - PA ACC SLOPE: 510 CM/SEC^2
BH CV ECHO MEAS - PA ACC TIME: 0.13 SEC
BH CV ECHO MEAS - PA MAX PG: 4.7 MMHG
BH CV ECHO MEAS - PA PR(ACCEL): 21.9 MMHG
BH CV ECHO MEAS - PA V2 MAX: 108 CM/SEC
BH CV ECHO MEAS - PI END-D VEL: 119 CM/SEC
BH CV ECHO MEAS - RAP SYSTOLE: 3 MMHG
BH CV ECHO MEAS - RVSP: 24 MMHG
BH CV ECHO MEAS - SI(AO): 205.4 ML/M^2
BH CV ECHO MEAS - SI(CUBED): 44.2 ML/M^2
BH CV ECHO MEAS - SI(LVOT): 20.7 ML/M^2
BH CV ECHO MEAS - SI(MOD-SP2): 29.5 ML/M^2
BH CV ECHO MEAS - SI(MOD-SP4): 23.6 ML/M^2
BH CV ECHO MEAS - SI(TEICH): 34 ML/M^2
BH CV ECHO MEAS - SV(AO): 452 ML
BH CV ECHO MEAS - SV(CUBED): 97.3 ML
BH CV ECHO MEAS - SV(LVOT): 45.5 ML
BH CV ECHO MEAS - SV(MOD-SP2): 65 ML
BH CV ECHO MEAS - SV(MOD-SP4): 52 ML
BH CV ECHO MEAS - SV(TEICH): 74.8 ML
BH CV ECHO MEAS - TR MAX PG: 21 MMHG
BH CV ECHO MEAS - TR MAX VEL: 228 CM/SEC
BH CV ECHO MEASUREMENTS AVERAGE E/E' RATIO: 7
BH CV VAS BP RIGHT ARM: NORMAL MMHG
BH CV XLRA - RV BASE: 4.3 CM
BH CV XLRA - RV LENGTH: 7 CM
BH CV XLRA - RV MID: 3.6 CM
BH CV XLRA - TDI S': 11.7 CM/SEC
LEFT ATRIUM VOLUME INDEX: 47.2 ML/M^2
LEFT ATRIUM VOLUME: 104 ML
LV EF 2D ECHO EST: 55 %

## 2021-07-21 PROCEDURE — 93306 TTE W/DOPPLER COMPLETE: CPT | Performed by: INTERNAL MEDICINE

## 2021-07-21 PROCEDURE — 93306 TTE W/DOPPLER COMPLETE: CPT

## 2021-08-05 ENCOUNTER — TELEPHONE (OUTPATIENT)
Dept: CARDIOLOGY | Facility: CLINIC | Age: 71
End: 2021-08-05

## 2021-08-05 NOTE — TELEPHONE ENCOUNTER
Pennie with Dr. Marino's office is requesting clearance for the patient to have a minor rectal surgery.        (P)[phone]

## 2021-08-10 NOTE — TELEPHONE ENCOUNTER
Pt called today to let us know he had cancelled procedure as they actually want to have clearance for general anesthesia for sphincterotomy.    Also Dr. Dudley had him undergo CT of chest without contrast.  Results will be scanned and routed to you

## 2021-08-30 ENCOUNTER — TELEPHONE (OUTPATIENT)
Dept: CARDIOLOGY | Facility: CLINIC | Age: 71
End: 2021-08-30

## 2021-08-30 NOTE — TELEPHONE ENCOUNTER
Patient has a few things that he would like to discuss with you. You can reach him at [phone].

## 2021-09-08 ENCOUNTER — OFFICE VISIT (OUTPATIENT)
Dept: CARDIOLOGY | Facility: CLINIC | Age: 71
End: 2021-09-08

## 2021-09-08 VITALS
BODY MASS INDEX: 29.93 KG/M2 | HEART RATE: 76 BPM | HEIGHT: 72 IN | DIASTOLIC BLOOD PRESSURE: 64 MMHG | SYSTOLIC BLOOD PRESSURE: 124 MMHG | OXYGEN SATURATION: 97 % | WEIGHT: 221 LBS

## 2021-09-08 DIAGNOSIS — I48.21 PERMANENT ATRIAL FIBRILLATION (HCC): ICD-10-CM

## 2021-09-08 DIAGNOSIS — J98.59 MEDIASTINAL MASS: Primary | ICD-10-CM

## 2021-09-08 DIAGNOSIS — I71.21 THORACIC ASCENDING AORTIC ANEURYSM (HCC): ICD-10-CM

## 2021-09-08 DIAGNOSIS — I38 VALVULAR HEART DISEASE: ICD-10-CM

## 2021-09-08 PROCEDURE — 99214 OFFICE O/P EST MOD 30 MIN: CPT | Performed by: INTERNAL MEDICINE

## 2021-09-08 RX ORDER — ALFUZOSIN HYDROCHLORIDE 10 MG/1
10 TABLET, EXTENDED RELEASE ORAL DAILY
COMMUNITY

## 2021-09-08 NOTE — PROGRESS NOTES
Feliz Amos  1950  [phone]    09/08/2021    [facility] CARDIOLOGY     Referring Provider: No ref. provider found     Srini Ornelas MD  [address]  [facility] [address]    Chief Complaint   Patient presents with   • Permanent atrial fibrillation (CMS/HCC)       Problem List:   1. Permanent atrial fibrillation:  a. CHADS 1.  b. Pulmonary vein isolation procedure in 2002 and 2003, [place], and February 2011 in [place] at [facility].   c. LAE approximately 5.2 cm by echo, 07/31/2013.   d. Holter monitor, 07/31/2013, with heart rate of 39-79, average 54 beats per minute with 6 PVCs and 107 PACs.   e. Recurrent atrial fibrillation, off amiodarone therapy with re-initiation of amiodarone and ECV at [facility] by Dr. Gould, 02/03/2014.  f. Hospitalization and initiation of Tikosyn with subsequent external cardioversion of atrial flutter, 06/26/2014.   g. Breakthrough atrial fibrillation with subsequent discontinuation of Tikosyn, April 2015.  h. Echocardiogram, 05/20/2015, revealing atrial fibrillation, normal LV systolic function, mild AR, moderate MR, left atrial dimension of 4.9 cm.  i. EKG, 02/16/2016, revealing atrial flutter at a rate of 100 on Toprol therapy.  j. Echo 4/24/17: NL LVEF, Mild MR/TR/AR  k. ER 5/22/17: AF avg HR 83 bpm with low 40 bpm   2. Valvular heart disease:  a. Echocardiogram, 07/31/2013, showing mild to moderate MR, mild to moderate AI. Normal LV systolic function.   b. Echo: 5/2015: NL LVEF , mod MR, Mild AI  c. Echocardiogram 2/20/2019: EF 55%, Moderate dilation of the aortic root is present, measures 4.6 cm. Moderate dilation of the ascending aorta is present, measures 4.4 cm, moderate AR, LA severely dilated. When compared with prior study there has been increase in aortic root dilation, aortic regurgitation moderate  d. Echo 7/21/2021 LVEF 55%, moderate dilation of aortic root, mod LVH, Mild AI  3. Thoracic aortic aneurysm    1. Ascending aorta measured at 4.8 cm by CT scan August 3, 2021  4. 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 Dyslipidemia.   6. Obesity, BMI 31.   7. Tobacco abuse.   8. Hypothyroid.   9. GERD.   10. Migraine headaches.    Allergies  No Known Allergies    Current Medications    Current Outpatient Medications:   •  acetaminophen (TYLENOL) 650 MG 8 hr tablet, Take 650 mg by mouth Daily As Needed., Disp: , Rfl:   •  alfuzosin (UROXATRAL) 10 MG 24 hr tablet, Take 10 mg by mouth Daily., Disp: , Rfl:   •  apixaban (Eliquis) 5 MG tablet tablet, Take 1 tablet by mouth 2 (Two) Times a Day., Disp: 60 tablet, Rfl: 11  •  cetirizine (ZyrTEC) 10 MG tablet, Take 10 mg by mouth As Needed., Disp: , Rfl:   •  Glucosamine-Chondroitin 250-200 MG tablet, Take  by mouth., Disp: , Rfl:   •  levothyroxine (SYNTHROID, LEVOTHROID) 50 MCG tablet, Take 50 mcg by mouth daily., Disp: , Rfl:   •  Magnesium 100 MG tablet, Take  by mouth Daily., Disp: , Rfl:   •  metoprolol succinate XL (TOPROL-XL) 25 MG 24 hr tablet, Take 1 tablet by mouth Daily., Disp: 30 tablet, Rfl: 11  •  Omega-3 Fatty Acids (fish oil) 1000 MG capsule capsule, Take  by mouth Daily With Breakfast., Disp: , Rfl:   •  rosuvastatin (CRESTOR) 5 MG tablet, Take 1 tablet by mouth Daily., Disp: 30 tablet, Rfl: 11  •  tadalafil (Cialis) 5 MG tablet, Take 5 mg by mouth Daily., Disp: , Rfl:   •  vitamin C (ASCORBIC ACID) 250 MG tablet, Take 250 mg by mouth Daily., Disp: , Rfl:   •  zolpidem (AMBIEN) 10 MG tablet, TAKE 1 TABLET AT BEDTIME prn, Disp: , Rfl: 2    History of Present Illness     Pt presents for follow up of AF/VHD. Since we last saw the pt, pt developed BRBPR. Diagnosed with hemorrhoids. Also with CT scan of chest due to WALKER. Found a pulm nodule and possible thymoma. Also diagnosed with 4.8 cm Thoracic aortic aneurysm. Denies any sustained palps, SOB at rest, CP, LH, and dizziness. Denies any hospitalizations, ER visits, bleeding, or TIA/CVA symptoms. Overall feels well.   "Blood pressures been stable at home.    ROS:  General:  Denies fatigue, weight gain or loss  Cardiovascular:  Denies CP, PND, syncope, near syncope, edema or palpitations.  Pulmonary:  Denies WALKER, cough, or wheezing      Vitals:    09/08/21 1326   BP: 124/64   BP Location: Left arm   Patient Position: Sitting   Pulse: 76   SpO2: 97%   Weight: 100 kg (221 lb)   Height: 182.9 cm (72\")     Body mass index is 29.97 kg/m².  PE:  General: NAD  Neck: no JVD, no carotid bruits, no TM  Heart Irreg irreg rate and rhythm, NL S1, S2, no rubs, murmurs  Lungs: CTA, no wheezes, rhonchi, or rales  Abd: soft, non-tender, NL BS  Ext: No musculoskeletal deformities, no edema, cyanosis, or clubbing  Psych: normal mood and affect    Diagnostic Data:      Procedures    1. Mediastinal mass    2. Thoracic ascending aortic aneurysm (CMS/HCC)    3. Permanent atrial fibrillation (CMS/HCC)    4. Valvular heart disease          Plan:  1) Abnormal mediastinal mass thymoma versus lymphoma.  Patient asymptomatic at this time.  We will pursue PET scan when he comes back from [place] in late October or early November.  2) Ascending thoracic aneurysm measured at 4.8 cm.  Needs yearly CT scans of the chest to follow clinically.  3) Pulmonary nodule: Follow-up with pulmonologist.  4) Permanent atrial fibrillation with adequate rate control asymptomatic monitor for now.  Continue oral anticoagulation.  5) Valvular heart disease: Last echocardiogram with no significant changes.      F/up in 6 months        "

## 2021-09-09 ENCOUNTER — TELEPHONE (OUTPATIENT)
Dept: CARDIOLOGY | Facility: CLINIC | Age: 71
End: 2021-09-09

## 2021-09-10 DIAGNOSIS — R91.8 ABNORMAL FINDING ON LUNG IMAGING: ICD-10-CM

## 2021-09-10 DIAGNOSIS — R93.89 ABNORMAL CT SCAN, CHEST: Primary | ICD-10-CM

## 2021-11-01 ENCOUNTER — APPOINTMENT (OUTPATIENT)
Dept: PET IMAGING | Facility: HOSPITAL | Age: 71
End: 2021-11-01

## 2021-11-01 ENCOUNTER — HOSPITAL ENCOUNTER (OUTPATIENT)
Dept: PET IMAGING | Facility: HOSPITAL | Age: 71
End: 2021-11-01

## 2022-09-26 RX ORDER — APIXABAN 5 MG/1
TABLET, FILM COATED ORAL
Qty: 60 TABLET | Refills: 11 | Status: SHIPPED | OUTPATIENT
Start: 2022-09-26 | End: 2022-10-12 | Stop reason: SDUPTHER

## 2022-09-26 RX ORDER — ROSUVASTATIN CALCIUM 5 MG/1
TABLET, COATED ORAL
Qty: 30 TABLET | Refills: 11 | OUTPATIENT
Start: 2022-09-26

## 2022-09-26 RX ORDER — METOPROLOL SUCCINATE 25 MG/1
TABLET, EXTENDED RELEASE ORAL
Qty: 30 TABLET | Refills: 11 | Status: SHIPPED | OUTPATIENT
Start: 2022-09-26 | End: 2022-10-12 | Stop reason: SDUPTHER

## 2022-09-28 ENCOUNTER — OFFICE VISIT (OUTPATIENT)
Dept: CARDIOLOGY | Facility: CLINIC | Age: 72
End: 2022-09-28

## 2022-09-28 VITALS
WEIGHT: 219 LBS | BODY MASS INDEX: 30.66 KG/M2 | HEART RATE: 79 BPM | OXYGEN SATURATION: 97 % | SYSTOLIC BLOOD PRESSURE: 122 MMHG | HEIGHT: 71 IN | DIASTOLIC BLOOD PRESSURE: 66 MMHG

## 2022-09-28 DIAGNOSIS — I71.21 THORACIC ASCENDING AORTIC ANEURYSM: ICD-10-CM

## 2022-09-28 DIAGNOSIS — I38 VALVULAR HEART DISEASE: ICD-10-CM

## 2022-09-28 DIAGNOSIS — I71.20 THORACIC AORTIC ANEURYSM WITHOUT RUPTURE: Primary | ICD-10-CM

## 2022-09-28 DIAGNOSIS — I48.21 PERMANENT ATRIAL FIBRILLATION: Primary | ICD-10-CM

## 2022-09-28 PROCEDURE — 99214 OFFICE O/P EST MOD 30 MIN: CPT | Performed by: INTERNAL MEDICINE

## 2022-09-28 NOTE — PROGRESS NOTES
Feliz Amos  1950  [phone]    09/28/2022    [facility] CARDIOLOGY MAIN CAMPUS     Referring Provider: No ref. provider found     Srini Ornelas MD  [address]  [facility] [address]    Chief Complaint   Patient presents with   • Atrial Fibrillation       Problem List:   1. Permanent atrial fibrillation:  a. CHADS 1 - On Eliquis   b. Pulmonary vein isolation procedure in 2002 and 2003, [place], and February 2011 in [place] at [facility].   c. LAE approximately 5.2 cm by echo, 07/31/2013.   d. Holter monitor, 07/31/2013, with heart rate of 39-79, average 54 beats per minute with 6 PVCs and 107 PACs.   e. Recurrent atrial fibrillation, off amiodarone therapy with re-initiation of amiodarone and ECV at [facility] by Dr. Gould, 02/03/2014.  f. Hospitalization and initiation of Tikosyn with subsequent external cardioversion of atrial flutter, 06/26/2014.   g. Breakthrough atrial fibrillation with subsequent discontinuation of Tikosyn, April 2015.  h. Echocardiogram, 05/20/2015, revealing atrial fibrillation, normal LV systolic function, mild AR, moderate MR, left atrial dimension of 4.9 cm.  i. EKG, 02/16/2016, revealing atrial flutter at a rate of 100 on Toprol therapy.  j. Echo 4/24/17: NL LVEF, Mild MR/TR/AR  k. ER 5/22/17: AF avg HR 83 bpm with low 40 bpm   2. Valvular heart disease:  a. Echocardiogram, 07/31/2013, showing mild to moderate MR, mild to moderate AI. Normal LV systolic function.   b. Echo: 5/2015: NL LVEF , mod MR, Mild AI  c. Echocardiogram 2/20/2019: EF 55%, Moderate dilation of the aortic root is present, measures 4.6 cm. Moderate dilation of the ascending aorta is present, measures 4.4 cm, moderate AR, LA severely dilated. When compared with prior study there has been increase in aortic root dilation, aortic regurgitation moderate  d. Echo 7/21/2021 LVEF 55%, moderate dilation of aortic root, mod LVH, Mild AI  3. Thoracic aortic  aneurysm   1. Ascending aorta measured at 4.8 cm by CT scan August 3, 2021  4. Mediastinal mass/thymoma: No significant change in size  5. Dyslipidemia.   6. Obesity, BMI 31.   7. Tobacco abuse.   8. Hypothyroid.   9. GERD.   10. Migraine headaches.  Allergies  No Known Allergies    Current Medications    Current Outpatient Medications:   •  acetaminophen (TYLENOL) 650 MG 8 hr tablet, Take 650 mg by mouth Daily As Needed., Disp: , Rfl:   •  alfuzosin (UROXATRAL) 10 MG 24 hr tablet, Take 10 mg by mouth Daily., Disp: , Rfl:   •  cetirizine (ZyrTEC) 10 MG tablet, Take 10 mg by mouth As Needed., Disp: , Rfl:   •  Eliquis 5 MG tablet tablet, TAKE ONE TABLET BY MOUTH TWICE A DAY, Disp: 60 tablet, Rfl: 11  •  Glucosamine-Chondroitin 250-200 MG tablet, Take  by mouth., Disp: , Rfl:   •  levothyroxine (SYNTHROID, LEVOTHROID) 50 MCG tablet, Take 50 mcg by mouth daily., Disp: , Rfl:   •  Magnesium 100 MG tablet, Take 1 capsule by mouth Daily., Disp: , Rfl:   •  metoprolol succinate XL (TOPROL-XL) 25 MG 24 hr tablet, TAKE ONE TABLET BY MOUTH DAILY, Disp: 30 tablet, Rfl: 11  •  Omega-3 Fatty Acids (fish oil) 1000 MG capsule capsule, Take 1,000 mg by mouth Daily With Breakfast., Disp: , Rfl:   •  rosuvastatin (CRESTOR) 5 MG tablet, Take 1 tablet by mouth Daily., Disp: 30 tablet, Rfl: 11  •  tadalafil (CIALIS) 5 MG tablet, Take 5 mg by mouth Daily., Disp: , Rfl:   •  vitamin C (ASCORBIC ACID) 250 MG tablet, Take 250 mg by mouth Daily., Disp: , Rfl:   •  zolpidem (AMBIEN) 10 MG tablet, TAKE 1 TABLET AT BEDTIME prn, Disp: , Rfl: 2    History of Present Illness     Pt presents for follow up of AF/VHD/TAA . Since we last saw the pt, pt denies any tachypalpitations, SOB, CP, LH, and dizziness. Denies any hospitalizations, ER visits, bleeding, or TIA/CVA symptoms. Overall feels well. HR and BP stable at home.  Feels well overall.  Continues to exercise and continues weight loss.    ROS:  General:  Denies fatigue  Cardiovascular:  Denies  "CP, PND, syncope, near syncope, edema or palpitations.  Pulmonary:  Denies WALKER, cough, or wheezing      Vitals:    09/28/22 1055   BP: 122/66   BP Location: Left arm   Patient Position: Sitting   Pulse: 79   SpO2: 97%   Weight: 99.3 kg (219 lb)   Height: 180.3 cm (71\")     Body mass index is 30.54 kg/m².  PE:  General: NAD  Neck: no JVD, no carotid bruits, no TM  Heart Irreg irreg rate and rhythm NL S1 S2  Lungs: CTA, no wheezes, rhonchi, or rales  Abd: soft, non-tender, NL BS  Ext: No musculoskeletal deformities, no edema, cyanosis, or clubbing  Psych: normal mood and affect    Diagnostic Data:      Procedures    1. Permanent atrial fibrillation (HCC)    2. Thoracic ascending aortic aneurysm    3. Valvular heart disease             Plan:  1) Permanent AF: rate controlled: on eliquis     2) Ascending thoracic aneurysm measured at 4.8 cm.  Needs yearly CT scans of the chest to follow clinically.    3) Pulmonary nodule: 4 mm in size: Per primary care doctor.    4) Valvular heart disease: Last echocardiogram 7/21/21 with no significant changes.  Repeat echocardiogram next  visit.        F/up in 12 months    Scribed for Levi Abrams MD by Thelma Gómez, RBAIN. 9/28/2022  13:01 EDT     I, Levi Abrams MD, personally performed the services described in this documentation as scribed by the above named individual in my presence, and it is both accurate and complete.  9/28/2022  13:01 EDT        "

## 2022-10-04 ENCOUNTER — HOSPITAL ENCOUNTER (OUTPATIENT)
Dept: CT IMAGING | Facility: HOSPITAL | Age: 72
Discharge: HOME | End: 2022-10-04
Admitting: INTERNAL MEDICINE

## 2022-10-04 DIAGNOSIS — I71.20 THORACIC AORTIC ANEURYSM WITHOUT RUPTURE: ICD-10-CM

## 2022-10-04 LAB — CREAT BLDA-MCNC: 1 MG/DL (ref 0.6–1.3)

## 2022-10-04 PROCEDURE — 82565 ASSAY OF CREATININE: CPT

## 2022-10-04 PROCEDURE — 0 IOPAMIDOL PER 1 ML: Performed by: INTERNAL MEDICINE

## 2022-10-04 PROCEDURE — 71270 CT THORAX DX C-/C+: CPT

## 2022-10-04 RX ADMIN — IOPAMIDOL 75 ML: 755 INJECTION, SOLUTION INTRAVENOUS at 14:14

## 2022-10-12 RX ORDER — ROSUVASTATIN CALCIUM 5 MG/1
5 TABLET, COATED ORAL DAILY
Qty: 90 TABLET | Refills: 3 | Status: SHIPPED | OUTPATIENT
Start: 2022-10-12

## 2022-10-12 RX ORDER — METOPROLOL SUCCINATE 25 MG/1
25 TABLET, EXTENDED RELEASE ORAL DAILY
Qty: 90 TABLET | Refills: 3 | Status: SHIPPED | OUTPATIENT
Start: 2022-10-12